# Patient Record
Sex: FEMALE | Race: WHITE | Employment: FULL TIME | URBAN - METROPOLITAN AREA
[De-identification: names, ages, dates, MRNs, and addresses within clinical notes are randomized per-mention and may not be internally consistent; named-entity substitution may affect disease eponyms.]

---

## 2017-03-20 ENCOUNTER — TELEPHONE (OUTPATIENT)
Dept: INTERNAL MEDICINE CLINIC | Age: 47
End: 2017-03-20

## 2017-03-20 NOTE — TELEPHONE ENCOUNTER
Patient states she needs a call back to get a Follow Up appt from her [facility] visit on 3/11/17 for Ear Pain & Swelling/Ear infection. Patient states antibiotics end this Wed., 3/22/17. Please call to schedule. No Appts available. Thank You

## 2017-03-20 NOTE — TELEPHONE ENCOUNTER
Spoke with patient she was placed on amoxicillin on 3/12 she will complete on Wed. Patient accepted an appointment with Dr. Lynnette Hoyos on Wed 3/22/17 at 3:30pm. Pt reported ear still feels  funny, like something was in it.

## 2017-03-22 ENCOUNTER — OFFICE VISIT (OUTPATIENT)
Dept: INTERNAL MEDICINE CLINIC | Age: 47
End: 2017-03-22

## 2017-03-22 VITALS
OXYGEN SATURATION: 97 % | BODY MASS INDEX: 37.49 KG/M2 | WEIGHT: 219.6 LBS | RESPIRATION RATE: 18 BRPM | TEMPERATURE: 97.9 F | HEIGHT: 64 IN | HEART RATE: 76 BPM | SYSTOLIC BLOOD PRESSURE: 125 MMHG | DIASTOLIC BLOOD PRESSURE: 86 MMHG

## 2017-03-22 DIAGNOSIS — H93.8X1 EAR PRESSURE, RIGHT: Primary | ICD-10-CM

## 2017-03-22 DIAGNOSIS — Z86.69 HISTORY OF EAR INFECTION: ICD-10-CM

## 2017-03-22 NOTE — PROGRESS NOTES
SUBJECTIVE:   Ms. Vinicius Messina is a 40 y.o. female who is here c/o ear right pain. Pt reports she was dx with a right ear infection and prescribed Amoxicillin at Urgent Care about a week and a half ago. Pt recalls being told her TM was pretty inflamed at the time. Pt claims she continues to have pain. Pt states she feels like something is in her ear. Pt endorses finishing Amoxicillin this morning. Pt denies having a fever. Pt c/o stiff neck and HA. At this time, she is otherwise doing well and has brought no other complaints to my attention today. For a list of the medical issues addressed today, see the assessment and plan below. PMH:   Past Medical History:   Diagnosis Date    Hypercholesterolemia     Hypertension     JULIAN on CPAP     Rosacea      PSH:  has a past surgical history that includes cholecystectomy ();  section (); and hysterectomy (). All: is allergic to codeine. MEDS:   Current Outpatient Prescriptions   Medication Sig    levothyroxine (SYNTHROID) 150 mcg tablet TAKE 1 TABLET BY MOUTH DAILY BEFORE BREAKFAST.  atorvastatin (LIPITOR) 10 mg tablet Take 1 Tab by mouth daily.  losartan-hydrochlorothiazide (HYZAAR) 100-25 mg per tablet Take 1 Tab by mouth daily.  multivitamin (ONE A DAY) tablet Take 1 Tab by mouth daily. No current facility-administered medications for this visit. FH: family history includes Cancer in her maternal grandfather and maternal grandmother; Diabetes in her mother; Heart Disease in her paternal grandfather; High Cholesterol in her father; Hypertension in her father and mother; Stroke in her mother. SH:  reports that she has never smoked. She has never used smokeless tobacco. She reports that she drinks alcohol.      Review of Systems - History obtained from the patient  General ROS: +HA, +stiff neck, otherwise negative  Psychological ROS: negative  Ophthalmic ROS: negative  ENT ROS: +feeling of something in right ear, otherwise negative  Respiratory ROS: no cough, shortness of breath, or wheezing  Cardiovascular ROS: no chest pain or dyspnea on exertion  Gastrointestinal ROS: no abdominal pain, change in bowel habits, or black or bloody stools  Genito-Urinary ROS: negative  Musculoskeletal ROS: negative  Neurological ROS: negative  Dermatological ROS: negative    OBJECTIVE:   Vitals:   Visit Vitals    /86 (BP 1 Location: Left arm, BP Patient Position: Sitting)    Pulse 76    Temp 97.9 °F (36.6 °C) (Oral)    Resp 18    Ht 5' 4\" (1.626 m)    Wt 219 lb 9.6 oz (99.6 kg)    SpO2 97%    BMI 37.69 kg/m2      Gen: Pleasant 40 y.o.  female in NAD. HEENT: NC/AT. EARS: TM's normal and canals equal bilaterally. No erythema or bulging. Right canal opening tender to palpation, no redness or pus. NECK: Supple. No LAD. No thyromegaly. HEART: RRR, No M/G/R. LUNGS: CTAB No W/R. EXTREMITIES: Warm. No C/C/E. NEURO: Alert and oriented x 3. Cranial nerves grossly intact. No focal sensory or motor deficits noted. SKIN: Warm. Dry. No rashes or other lesions noted. ASSESSMENT/ PLAN:   Richard Hartman was seen today for ear pain, other and other. Diagnoses and all orders for this visit:    Ear pressure, right    History of ear infection      Pt was advised to use polysporin on the opening of the right ear canal for 2-3 days. Pt will f/u if symptoms worsen or fail to improve. Follow-up Disposition:  Return if symptoms worsen or fail to improve. I have reviewed the patient's medications and risks/side effects/benefits were discussed. Diagnosis(-es) explained to patient and questions answered. Literature provided where appropriate.      Written by Elham Stewart, as dictated by Malena Thomas MD.

## 2017-03-22 NOTE — MR AVS SNAPSHOT
Visit Information Date & Time Provider Department Dept. Phone Encounter #  
 3/22/2017  3:30 PM Otilia Valente, [address] [id number] Follow-up Instructions Return if symptoms worsen or fail to improve. Your Appointments 5/5/2017  9:00 AM  
ROUTINE CARE with Otilia Valente MD  
[facility] [address]) Appt Note: 6 month follow up  
 [facility] [address] [id number]  
[address] [facility] [address] [place] [address] Upcoming Health Maintenance Date Due DTaP/Tdap/Td series (1 - Tdap) 9/2/1992 PAP AKA CERVICAL CYTOLOGY 9/2/1992 Allergies as of 3/22/2017  Review Complete On: 3/22/2017 By: Mortimer Gillis Severity Noted Reaction Type Reactions Codeine High 05/27/2014   Side Effect Nausea and Vomiting  
 severe N&V Current Immunizations  Reviewed on 11/18/2016 Name Date Influenza Vaccine (Quad) PF 11/18/2016  9:31 AM  
 Influenza Vaccine PF 11/20/2015 Not reviewed this visit You Were Diagnosed With   
  
 Codes Comments Ear pressure, right    -  Primary ICD-10-CM: H93.8X1 ICD-9-CM: 388.8 History of ear infection     ICD-10-CM: Z86.69 
ICD-9-CM: V12.49 Vitals BP Pulse Temp Resp Height(growth percentile) Weight(growth percentile) 125/86 (BP 1 Location: Left arm, BP Patient Position: Sitting) 76 97.9 °F (36.6 °C) (Oral) 18 5' 4\" (1.626 m) 219 lb 9.6 oz (99.6 kg) SpO2 BMI OB Status Smoking Status 97% 37.69 kg/m2 Hysterectomy Never Smoker BMI and BSA Data Body Mass Index Body Surface Area  
 37.69 kg/m 2 2.12 m 2 Preferred Pharmacy Pharmacy Name Phone [id number] [address], [address] [phone] Your Updated Medication List  
  
   
This list is accurate as of: 3/22/17  4:43 PM.  Always use your most recent med list.  
  
  
  
  
 atorvastatin 10 mg tablet Commonly known as:  LIPITOR Take 1 Tab by mouth daily. levothyroxine 150 mcg tablet Commonly known as:  SYNTHROID  
TAKE 1 TABLET BY MOUTH DAILY BEFORE BREAKFAST. losartan-hydroCHLOROthiazide 100-25 mg per tablet Commonly known as:  HYZAAR Take 1 Tab by mouth daily. multivitamin tablet Commonly known as:  ONE A DAY Take 1 Tab by mouth daily. Follow-up Instructions Return if symptoms worsen or fail to improve. Introducing [facility] & HEALTH SERVICES! Dear Cheli: 
Thank you for requesting a BuildZoom account. Our records indicate that you already have an active BuildZoom account. You can access your account anytime at [email or web address] Did you know that you can access your hospital and ER discharge instructions at any time in BuildZoom? You can also review all of your test results from your hospital stay or ER visit. Additional Information If you have questions, please visit the Frequently Asked Questions section of the BuildZoom website at [email or web address] Remember, BuildZoom is NOT to be used for urgent needs. For medical emergencies, dial 911. Now available from your iPhone and Android! Please provide this summary of care documentation to your next provider. Your primary care clinician is listed as Eliecer Guerin. If you have any questions after today's visit, please call [phone].

## 2017-03-22 NOTE — PROGRESS NOTES
Reviewed record in preparation for visit and have obtained necessary documentation. Identified pt with two pt identifiers(name and ). Chief Complaint   Patient presents with    Ear Pain     pt states she went to urgent care for right ear pain diagnose with an ear infectiom was giving an antibiotic but stilll havin alittle pain and discomfortable    Other     pt c/o of a stff neck withdiscorfable       Health Maintenance Due   Topic Date Due    DTaP/Tdap/Td series (1 - Tdap) 1992    PAP AKA CERVICAL CYTOLOGY  1992       Coordination of Care Questionnaire:  :     1. Have you been to the ER, urgent care clinic since your last visit? Hospitalized since your last visit?no  2. Have you seen or consulted any other health care providers outside of the [address] [place] [address] since your last visit? Include any pap smears or colon screening.  no

## 2017-04-07 RX ORDER — ATORVASTATIN CALCIUM 10 MG/1
TABLET, FILM COATED ORAL
Qty: 30 TAB | Refills: 5 | Status: SHIPPED | OUTPATIENT
Start: 2017-04-07 | End: 2017-10-17 | Stop reason: SDUPTHER

## 2017-07-06 DIAGNOSIS — E03.9 HYPOTHYROIDISM, UNSPECIFIED TYPE: ICD-10-CM

## 2017-07-06 NOTE — TELEPHONE ENCOUNTER
From: Josefina Chavez  To:  Ryanne Pugh MD  Sent: 7/6/2017 1:11 PM EDT  Subject: Medication Renewal Request    Original authorizing provider: MD Josefina Vazquez would like a refill of the following medications:  levothyroxine (SYNTHROID) 150 mcg tablet Ryanne Pugh MD]    Preferred pharmacy: [facility] [address] [place] [address]:

## 2017-07-07 RX ORDER — LEVOTHYROXINE SODIUM 150 UG/1
150 TABLET ORAL
Qty: 90 TAB | Refills: 1 | Status: SHIPPED | OUTPATIENT
Start: 2017-07-07

## 2017-09-15 DIAGNOSIS — I10 ESSENTIAL HYPERTENSION WITH GOAL BLOOD PRESSURE LESS THAN 140/90: ICD-10-CM

## 2017-09-18 RX ORDER — LOSARTAN POTASSIUM AND HYDROCHLOROTHIAZIDE 25; 100 MG/1; MG/1
TABLET ORAL
Qty: 30 TAB | Refills: 5 | Status: SHIPPED | OUTPATIENT
Start: 2017-09-18 | End: 2018-04-10

## 2017-10-17 RX ORDER — ATORVASTATIN CALCIUM 10 MG/1
TABLET, FILM COATED ORAL
Qty: 30 TAB | Refills: 5 | Status: SHIPPED | OUTPATIENT
Start: 2017-10-17

## 2018-03-16 ENCOUNTER — OFFICE VISIT (OUTPATIENT)
Dept: URGENT CARE | Age: 48
End: 2018-03-16

## 2018-03-16 VITALS
BODY MASS INDEX: 38.07 KG/M2 | WEIGHT: 223 LBS | HEART RATE: 60 BPM | TEMPERATURE: 98 F | HEIGHT: 64 IN | RESPIRATION RATE: 16 BRPM | DIASTOLIC BLOOD PRESSURE: 71 MMHG | SYSTOLIC BLOOD PRESSURE: 148 MMHG | OXYGEN SATURATION: 99 %

## 2018-03-16 DIAGNOSIS — H70.002 ACUTE MASTOIDITIS OF LEFT SIDE: Primary | ICD-10-CM

## 2018-03-16 LAB
BILIRUB UR QL STRIP: NORMAL
GLUCOSE UR-MCNC: NORMAL MG/DL
KETONES P FAST UR STRIP-MCNC: NORMAL MG/DL
PH UR STRIP: NORMAL [PH] (ref 4.6–8)
PROT UR QL STRIP: NORMAL
SP GR UR STRIP: NORMAL (ref 1–1.03)
UA UROBILINOGEN AMB POC: NORMAL (ref 0.2–1)
URINALYSIS CLARITY POC: NORMAL
URINALYSIS COLOR POC: NORMAL
URINE BLOOD POC: NORMAL
URINE LEUKOCYTES POC: NORMAL
URINE NITRITES POC: NORMAL

## 2018-03-16 RX ORDER — CEFDINIR 300 MG/1
300 CAPSULE ORAL 2 TIMES DAILY
Qty: 20 CAP | Refills: 0 | Status: SHIPPED | OUTPATIENT
Start: 2018-03-16 | End: 2018-03-26

## 2018-03-16 RX ORDER — PREDNISONE 20 MG/1
60 TABLET ORAL 2 TIMES DAILY
Qty: 15 TAB | Refills: 0 | Status: SHIPPED | OUTPATIENT
Start: 2018-03-16 | End: 2018-03-21

## 2018-03-16 NOTE — MR AVS SNAPSHOT
[facility] [address] [place] [id number] 
[phone] Patient: Rj Ramos MRN: [id number] TPK:[birth year] Visit Information Date & Time Provider Department Dept. Phone Encounter #  
 3/16/2018  5:30 PM [facility] [phone] [id number] Follow-up Instructions Return for Follow up with PCP. Upcoming Health Maintenance Date Due DTaP/Tdap/Td series (1 - Tdap) 9/2/1992 PAP AKA CERVICAL CYTOLOGY 9/2/1992 Influenza Age 5 to Adult 8/1/2017 Allergies as of 3/16/2018  Review Complete On: 3/16/2018 By: Teresa Lopez RN Severity Noted Reaction Type Reactions Codeine High 05/27/2014   Side Effect Nausea and Vomiting  
 severe N&V Current Immunizations  Reviewed on 11/18/2016 Name Date Influenza Vaccine (Quad) PF 11/18/2016  9:31 AM  
 Influenza Vaccine PF 11/20/2015 Not reviewed this visit You Were Diagnosed With   
  
 Codes Comments Acute mastoiditis of left side    -  Primary ICD-10-CM: H70.002 ICD-9-CM: 383.00 Vitals BP Pulse Temp Resp Height(growth percentile) Weight(growth percentile) 148/71 60 98 °F (36.7 °C) 16 5' 4\" (1.626 m) 223 lb (101.2 kg) SpO2 BMI OB Status Smoking Status 99% 38.28 kg/m2 Hysterectomy Never Smoker BMI and BSA Data Body Mass Index Body Surface Area  
 38.28 kg/m 2 2.14 m 2 Preferred Pharmacy Pharmacy Name Phone [id number], [address] [phone] Your Updated Medication List  
  
   
This list is accurate as of 3/16/18  6:51 PM.  Always use your most recent med list.  
  
  
  
  
 atorvastatin 10 mg tablet Commonly known as:  LIPITOR  
take 1 tablet by mouth once daily  
  
 cefdinir 300 mg capsule Commonly known as:  OMNICEF Take 1 Cap by mouth two (2) times a day for 10 days. levothyroxine 150 mcg tablet Commonly known as:  SYNTHROID Take 1 Tab by mouth Daily (before breakfast). losartan-hydroCHLOROthiazide 100-25 mg per tablet Commonly known as:  HYZAAR  
take 1 tablet by mouth once daily  
  
 multivitamin tablet Commonly known as:  ONE A DAY Take 1 Tab by mouth daily. predniSONE 20 mg tablet Commonly known as:  Ama Maxon Take 3 Tabs by mouth two (2) times a day for 5 days. With food Prescriptions Sent to Pharmacy Refills  
 cefdinir (OMNICEF) 300 mg capsule 0 Sig: Take 1 Cap by mouth two (2) times a day for 10 days. Class: Normal  
 Pharmacy: [address] Ph #: [phone] Route: Oral  
 predniSONE (DELTASONE) 20 mg tablet 0 Sig: Take 3 Tabs by mouth two (2) times a day for 5 days. With food Class: Normal  
 Pharmacy: [address] Ph #: [phone] Route: Oral  
  
We Performed the Following AMB POC URINALYSIS DIP STICK AUTO W/O MICRO [18676 CPT(R)] Follow-up Instructions Return for Follow up with PCP. Introducing [facility] & HEALTH SERVICES! Dear Cheli: 
Thank you for requesting a Invieo account. Our records indicate that you already have an active Invieo account. You can access your account anytime at [email or web address] Did you know that you can access your hospital and ER discharge instructions at any time in Invieo? You can also review all of your test results from your hospital stay or ER visit. Additional Information If you have questions, please visit the Frequently Asked Questions section of the Invieo website at [email or web address] Remember, Invieo is NOT to be used for urgent needs. For medical emergencies, dial 911. Now available from your iPhone and Android! Please provide this summary of care documentation to your next provider. Your primary care clinician is listed as NOT ON FILE. If you have any questions after today's visit, please call [phone].

## 2018-03-16 NOTE — PROGRESS NOTES
Patient is a 56 y.o. female presenting with sinus pain. The history is provided by the patient. Sinus Pain   This is a new problem. The current episode started more than 2 days ago. The problem occurs constantly. The problem has been rapidly worsening. Associated symptoms include headaches (on left tample area- behind ear- throbbing- 8/10- intermittent ). Associated symptoms comments: H/o cold and sinus congestion x 7-10 days. The symptoms are aggravated by bending. Nothing relieves the symptoms. She has tried nothing for the symptoms. Past Medical History:   Diagnosis Date    Hypercholesterolemia     Hypertension     JULIAN on CPAP     Rosacea         Past Surgical History:   Procedure Laterality Date    HX  SECTION  2008    HX CHOLECYSTECTOMY  2006    HX HYSTERECTOMY  2012         Family History   Problem Relation Age of Onset    Cancer Maternal Grandmother      colon    Cancer Maternal Grandfather      lung    Heart Disease Paternal Grandfather     Diabetes Mother     Hypertension Mother     Stroke Mother     Hypertension Father     High Cholesterol Father         Social History     Social History    Marital status:      Spouse name: N/A    Number of children: N/A    Years of education: N/A     Occupational History    Not on file. Social History Main Topics    Smoking status: Never Smoker    Smokeless tobacco: Never Used    Alcohol use 0.0 oz/week     0 Standard drinks or equivalent per week      Comment: occasionally    Drug use: Not on file    Sexual activity: Yes     Partners: Male     Birth control/ protection: Surgical     Other Topics Concern    Not on file     Social History Narrative                ALLERGIES: Codeine    Review of Systems   HENT: Positive for sinus pain. Neurological: Positive for headaches (on left tample area- behind ear- throbbing- 8/10- intermittent ). All other systems reviewed and are negative.       Vitals:    18 1744   BP: 148/71   Pulse: 60   Resp: 16   Temp: 98 °F (36.7 °C)   SpO2: 99%   Weight: 223 lb (101.2 kg)   Height: 5' 4\" (1.626 m)       Physical Exam   Constitutional: No distress. HENT:   Head:       Right Ear: Tympanic membrane and ear canal normal.   Left Ear: Tympanic membrane and ear canal normal.   Nose: Nose normal.   Mouth/Throat: No oropharyngeal exudate, posterior oropharyngeal edema or posterior oropharyngeal erythema. Eyes: Conjunctivae are normal. Right eye exhibits no discharge. Left eye exhibits no discharge. Neck: Neck supple. Pulmonary/Chest: Effort normal and breath sounds normal. No respiratory distress. She has no wheezes. She has no rales. Lymphadenopathy:     She has no cervical adenopathy. Skin: No rash noted. Nursing note and vitals reviewed. MDM    Procedures    Fluids/ gargles  Claritin/ allegra   Tylenol cold-sinus - max strength 1-2 tab 4 times/ day    with Advil as needed      ICD-10-CM ICD-9-CM    1. Acute mastoiditis of left side H70.002 383.00 AMB POC URINALYSIS DIP STICK AUTO W/O MICRO      cefdinir (OMNICEF) 300 mg capsule     Medications Ordered Today   Medications    cefdinir (OMNICEF) 300 mg capsule     Sig: Take 1 Cap by mouth two (2) times a day for 10 days. Dispense:  20 Cap     Refill:  0    predniSONE (DELTASONE) 20 mg tablet     Sig: Take 3 Tabs by mouth two (2) times a day for 5 days.  With food     Dispense:  15 Tab     Refill:  0     Results for orders placed or performed in visit on 03/16/18   AMB POC URINALYSIS DIP STICK AUTO W/O MICRO   Result Value Ref Range    Color (UA POC)      Clarity (UA POC)      Glucose (UA POC)  Negative    Bilirubin (UA POC)  Negative    Ketones (UA POC)  Negative    Specific gravity (UA POC)  1.001 - 1.035    Blood (UA POC)  Negative    pH (UA POC)  4.6 - 8.0    Protein (UA POC)  Negative    Urobilinogen (UA POC)  0.2 - 1    Nitrites (UA POC)  Negative    Leukocyte esterase (UA POC)  Negative    Narrative    This test was ordered in error. The patients condition was discussed with the patient and they understand. The patient is to follow up with primary care doctor. If signs and symptoms become worse the pt is to go to the ER. The patient is to take medications as prescribed.

## 2018-04-10 ENCOUNTER — APPOINTMENT (OUTPATIENT)
Dept: GENERAL RADIOLOGY | Age: 48
End: 2018-04-10
Attending: PHYSICIAN ASSISTANT
Payer: COMMERCIAL

## 2018-04-10 ENCOUNTER — HOSPITAL ENCOUNTER (EMERGENCY)
Age: 48
Discharge: HOME | End: 2018-04-10
Attending: EMERGENCY MEDICINE
Payer: COMMERCIAL

## 2018-04-10 VITALS
HEIGHT: 64 IN | DIASTOLIC BLOOD PRESSURE: 84 MMHG | OXYGEN SATURATION: 98 % | BODY MASS INDEX: 38.2 KG/M2 | TEMPERATURE: 97.4 F | HEART RATE: 97 BPM | SYSTOLIC BLOOD PRESSURE: 141 MMHG | WEIGHT: 223.77 LBS | RESPIRATION RATE: 16 BRPM

## 2018-04-10 DIAGNOSIS — R07.89 ATYPICAL CHEST PAIN: Primary | ICD-10-CM

## 2018-04-10 LAB
ALBUMIN SERPL-MCNC: 4.2 G/DL (ref 3.5–5)
ALBUMIN/GLOB SERPL: 1.3 {RATIO} (ref 1.1–2.2)
ALP SERPL-CCNC: 85 U/L (ref 45–117)
ALT SERPL-CCNC: 42 U/L (ref 12–78)
ANION GAP SERPL CALC-SCNC: 5 MMOL/L (ref 5–15)
APPEARANCE UR: CLEAR
AST SERPL-CCNC: 26 U/L (ref 15–37)
BASOPHILS # BLD: 0 K/UL (ref 0–0.1)
BASOPHILS NFR BLD: 0 % (ref 0–1)
BILIRUB SERPL-MCNC: 0.7 MG/DL (ref 0.2–1)
BILIRUB UR QL: NEGATIVE
BUN SERPL-MCNC: 17 MG/DL (ref 6–20)
BUN/CREAT SERPL: 15 (ref 12–20)
CALCIUM SERPL-MCNC: 9.3 MG/DL (ref 8.5–10.1)
CHLORIDE SERPL-SCNC: 101 MMOL/L (ref 97–108)
CK MB CFR SERPL CALC: 1.5 % (ref 0–2.5)
CK MB SERPL-MCNC: 1.6 NG/ML (ref 5–25)
CK SERPL-CCNC: 109 U/L (ref 26–192)
CO2 SERPL-SCNC: 32 MMOL/L (ref 21–32)
COLOR UR: NORMAL
CREAT SERPL-MCNC: 1.12 MG/DL (ref 0.55–1.02)
D DIMER PPP FEU-MCNC: 0.31 MG/L FEU (ref 0–0.65)
DIFFERENTIAL METHOD BLD: ABNORMAL
EOSINOPHIL # BLD: 0.1 K/UL (ref 0–0.4)
EOSINOPHIL NFR BLD: 1 % (ref 0–7)
ERYTHROCYTE [DISTWIDTH] IN BLOOD BY AUTOMATED COUNT: 12.5 % (ref 11.5–14.5)
GLOBULIN SER CALC-MCNC: 3.3 G/DL (ref 2–4)
GLUCOSE SERPL-MCNC: 95 MG/DL (ref 65–100)
GLUCOSE UR STRIP.AUTO-MCNC: NEGATIVE MG/DL
HCT VFR BLD AUTO: 39 % (ref 35–47)
HGB BLD-MCNC: 13.6 G/DL (ref 11.5–16)
HGB UR QL STRIP: NEGATIVE
IMM GRANULOCYTES # BLD: 0.1 K/UL (ref 0–0.04)
IMM GRANULOCYTES NFR BLD AUTO: 0 % (ref 0–0.5)
KETONES UR QL STRIP.AUTO: NEGATIVE MG/DL
LEUKOCYTE ESTERASE UR QL STRIP.AUTO: NEGATIVE
LYMPHOCYTES # BLD: 1.6 K/UL (ref 0.8–3.5)
LYMPHOCYTES NFR BLD: 14 % (ref 12–49)
MCH RBC QN AUTO: 29.4 PG (ref 26–34)
MCHC RBC AUTO-ENTMCNC: 34.9 G/DL (ref 30–36.5)
MCV RBC AUTO: 84.4 FL (ref 80–99)
MONOCYTES # BLD: 1.6 K/UL (ref 0–1)
MONOCYTES NFR BLD: 13 % (ref 5–13)
NEUTS SEG # BLD: 8.5 K/UL (ref 1.8–8)
NEUTS SEG NFR BLD: 71 % (ref 32–75)
NITRITE UR QL STRIP.AUTO: NEGATIVE
NRBC # BLD: 0 K/UL (ref 0–0.01)
NRBC BLD-RTO: 0 PER 100 WBC
PH UR STRIP: 5 [PH] (ref 5–8)
PLATELET # BLD AUTO: 322 K/UL (ref 150–400)
PMV BLD AUTO: 9.9 FL (ref 8.9–12.9)
POTASSIUM SERPL-SCNC: 3.4 MMOL/L (ref 3.5–5.1)
PROT SERPL-MCNC: 7.5 G/DL (ref 6.4–8.2)
PROT UR STRIP-MCNC: NEGATIVE MG/DL
RBC # BLD AUTO: 4.62 M/UL (ref 3.8–5.2)
SODIUM SERPL-SCNC: 138 MMOL/L (ref 136–145)
SP GR UR REFRACTOMETRY: 1.01 (ref 1–1.03)
TROPONIN I SERPL-MCNC: <0.04 NG/ML
UROBILINOGEN UR QL STRIP.AUTO: 0.2 EU/DL (ref 0.2–1)
WBC # BLD AUTO: 11.9 K/UL (ref 3.6–11)

## 2018-04-10 PROCEDURE — 84484 ASSAY OF TROPONIN QUANT: CPT | Performed by: PHYSICIAN ASSISTANT

## 2018-04-10 PROCEDURE — 36415 COLL VENOUS BLD VENIPUNCTURE: CPT | Performed by: PHYSICIAN ASSISTANT

## 2018-04-10 PROCEDURE — 71046 X-RAY EXAM CHEST 2 VIEWS: CPT

## 2018-04-10 PROCEDURE — 85025 COMPLETE CBC W/AUTO DIFF WBC: CPT | Performed by: PHYSICIAN ASSISTANT

## 2018-04-10 PROCEDURE — 80053 COMPREHEN METABOLIC PANEL: CPT | Performed by: PHYSICIAN ASSISTANT

## 2018-04-10 PROCEDURE — 82550 ASSAY OF CK (CPK): CPT | Performed by: PHYSICIAN ASSISTANT

## 2018-04-10 PROCEDURE — 85379 FIBRIN DEGRADATION QUANT: CPT | Performed by: PHYSICIAN ASSISTANT

## 2018-04-10 PROCEDURE — 81003 URINALYSIS AUTO W/O SCOPE: CPT | Performed by: EMERGENCY MEDICINE

## 2018-04-10 PROCEDURE — 93005 ELECTROCARDIOGRAM TRACING: CPT

## 2018-04-10 PROCEDURE — 99283 EMERGENCY DEPT VISIT LOW MDM: CPT

## 2018-04-10 RX ORDER — LISINOPRIL 20 MG/1
20 TABLET ORAL DAILY
Qty: 14 TAB | Refills: 0 | Status: SHIPPED | OUTPATIENT
Start: 2018-04-10

## 2018-04-10 RX ORDER — FAMOTIDINE 20 MG/1
20 TABLET, FILM COATED ORAL 2 TIMES DAILY
Qty: 20 TAB | Refills: 0 | Status: SHIPPED | OUTPATIENT
Start: 2018-04-10 | End: 2018-11-06

## 2018-04-10 NOTE — ED PROVIDER NOTES
EMERGENCY DEPARTMENT HISTORY AND PHYSICAL EXAM      Date: 4/10/2018  Patient Name: Sarah Escudero     I have seen and evaluated this patient in the Express Care portion of triage for generalized weakness, fatigue and nausea. The patients care will begin now and orders have been placed. This patient will be seen and provided further care in the Emergency Room. Written by Ravinder Atwood. Thomas, a scribe for [employer].    History of Presenting Illness     No chief complaint on file. History Provided By: Patient    HPI: Sarah Escudero, 56 y.o. female with PMHx significant for HTN, JULIAN and hypercholesterolemia, presents ambulatory to the ED with cc of intermittent chest pain/pressure radiating into neck and bilateral UE pressure since this morning. Pt also c/o nausea, fatigue and mild SOB today since awakening but states the fatigue has been progressively worsening for the past few days. Pt reports CP is currently 1/10 but intermittently experiences episodes of sharp CP, increasing pain to 3/10. She felt  normal yesterday and was able to go for a walk without any SOB (exercises on a treadmill at home). She has not taken any medications for her sxs. There are no modifying factors. Pt reports being placed on Prednisone and an abx in March for mastoiditis but those symptoms have resolved. Pt does not have a significant cardiac hx. She denies any significant recent travel. She takes Synthroid, losartan and Lipitor daily. She denies tobacco use and endorses minimal EtOH consumption. She denies any fevers, chills, vomiting, diarrhea, or leg swelling. PCP: Not On File [facility]    There are no other complaints, changes, or physical findings at this time.     Current Outpatient Prescriptions   Medication Sig Dispense Refill    atorvastatin (LIPITOR) 10 mg tablet take 1 tablet by mouth once daily 30 Tab 5    losartan-hydroCHLOROthiazide (HYZAAR) 100-25 mg per tablet take 1 tablet by mouth once daily 30 Tab 5  levothyroxine (SYNTHROID) 150 mcg tablet Take 1 Tab by mouth Daily (before breakfast). 90 Tab 1    multivitamin (ONE A DAY) tablet Take 1 Tab by mouth daily. Past History     Past Medical History:  Past Medical History:   Diagnosis Date    Hypercholesterolemia     Hypertension     JULIAN on CPAP     Rosacea        Past Surgical History:  Past Surgical History:   Procedure Laterality Date    HX  SECTION  2008    HX CHOLECYSTECTOMY  2006    HX HYSTERECTOMY  2012       Family History:  Family History   Problem Relation Age of Onset    Cancer Maternal Grandmother      colon    Cancer Maternal Grandfather      lung    Heart Disease Paternal Grandfather     Diabetes Mother     Hypertension Mother     Stroke Mother     Hypertension Father     High Cholesterol Father        Social History:  Social History   Substance Use Topics    Smoking status: Never Smoker    Smokeless tobacco: Never Used    Alcohol use 0.0 oz/week     0 Standard drinks or equivalent per week      Comment: occasionally       Allergies: Allergies   Allergen Reactions    Codeine Nausea and Vomiting     severe N&V         Review of Systems   Review of Systems   Constitutional: Positive for fatigue. Negative for activity change, appetite change, chills, fever and unexpected weight change. HENT: Negative for congestion. Eyes: Negative for pain and visual disturbance. Respiratory: Positive for shortness of breath (mild). Negative for cough. Cardiovascular: Positive for chest pain. Gastrointestinal: Positive for nausea. Negative for abdominal pain, diarrhea and vomiting. Genitourinary: Negative for dysuria. Musculoskeletal: Negative for back pain. Skin: Negative for rash. Neurological: Negative for headaches. Physical Exam   Physical Exam   Constitutional: She is oriented to person, place, and time. She appears well-developed and well-nourished. HENT:   Head: Normocephalic and atraumatic. Mouth/Throat: Oropharynx is clear and moist.   Eyes: Conjunctivae and EOM are normal. Pupils are equal, round, and reactive to light. Right eye exhibits no discharge. Left eye exhibits no discharge. Neck: Normal range of motion. Neck supple. Cardiovascular: Normal rate, regular rhythm and normal heart sounds. No murmur heard. Pulmonary/Chest: Effort normal and breath sounds normal. No respiratory distress. She has no wheezes. She has no rales. Abdominal: Soft. Bowel sounds are normal. She exhibits no distension. There is no tenderness. Musculoskeletal: Normal range of motion. She exhibits no edema. Neurological: She is alert and oriented to person, place, and time. No cranial nerve deficit. She exhibits normal muscle tone. Skin: Skin is warm and dry. No rash noted. She is not diaphoretic. Nursing note and vitals reviewed. Diagnostic Study Results     Labs -     Recent Results (from the past 12 hour(s))   D DIMER    Collection Time: 04/10/18  4:14 PM   Result Value Ref Range    D-dimer 0.31 0.00 - 0.65 mg/L FEU   CBC WITH AUTOMATED DIFF    Collection Time: 04/10/18  4:14 PM   Result Value Ref Range    WBC 11.9 (H) 3.6 - 11.0 K/uL    RBC 4.62 3.80 - 5.20 M/uL    HGB 13.6 11.5 - 16.0 g/dL    HCT 39.0 35.0 - 47.0 %    MCV 84.4 80.0 - 99.0 FL    MCH 29.4 26.0 - 34.0 PG    MCHC 34.9 30.0 - 36.5 g/dL    RDW 12.5 11.5 - 14.5 %    PLATELET 158 146 - 841 K/uL    MPV 9.9 8.9 - 12.9 FL    NRBC 0.0 0  WBC    ABSOLUTE NRBC 0.00 0.00 - 0.01 K/uL    NEUTROPHILS 71 32 - 75 %    LYMPHOCYTES 14 12 - 49 %    MONOCYTES 13 5 - 13 %    EOSINOPHILS 1 0 - 7 %    BASOPHILS 0 0 - 1 %    IMMATURE GRANULOCYTES 0 0.0 - 0.5 %    ABS. NEUTROPHILS 8.5 (H) 1.8 - 8.0 K/UL    ABS. LYMPHOCYTES 1.6 0.8 - 3.5 K/UL    ABS. MONOCYTES 1.6 (H) 0.0 - 1.0 K/UL    ABS. EOSINOPHILS 0.1 0.0 - 0.4 K/UL    ABS. BASOPHILS 0.0 0.0 - 0.1 K/UL    ABS. IMM.  GRANS. 0.1 (H) 0.00 - 0.04 K/UL    DF AUTOMATED     METABOLIC PANEL, COMPREHENSIVE    Collection Time: 04/10/18  4:14 PM   Result Value Ref Range    Sodium 138 136 - 145 mmol/L    Potassium 3.4 (L) 3.5 - 5.1 mmol/L    Chloride 101 97 - 108 mmol/L    CO2 32 21 - 32 mmol/L    Anion gap 5 5 - 15 mmol/L    Glucose 95 65 - 100 mg/dL    BUN 17 6 - 20 MG/DL    Creatinine 1.12 (H) 0.55 - 1.02 MG/DL    BUN/Creatinine ratio 15 12 - 20      GFR est AA >60 >60 ml/min/1.73m2    GFR est non-AA 52 (L) >60 ml/min/1.73m2    Calcium 9.3 8.5 - 10.1 MG/DL    Bilirubin, total 0.7 0.2 - 1.0 MG/DL    ALT (SGPT) 42 12 - 78 U/L    AST (SGOT) 26 15 - 37 U/L    Alk.  phosphatase 85 45 - 117 U/L    Protein, total 7.5 6.4 - 8.2 g/dL    Albumin 4.2 3.5 - 5.0 g/dL    Globulin 3.3 2.0 - 4.0 g/dL    A-G Ratio 1.3 1.1 - 2.2     TROPONIN I    Collection Time: 04/10/18  4:14 PM   Result Value Ref Range    Troponin-I, Qt. <0.04 <0.05 ng/mL   CK W/ CKMB & INDEX    Collection Time: 04/10/18  4:14 PM   Result Value Ref Range     26 - 192 U/L    CK - MB 1.6 <3.6 NG/ML    CK-MB Index 1.5 0 - 2.5     EKG, 12 LEAD, INITIAL    Collection Time: 04/10/18  4:16 PM   Result Value Ref Range    Ventricular Rate 81 BPM    Atrial Rate 81 BPM    P-R Interval 160 ms    QRS Duration 92 ms    Q-T Interval 402 ms    QTC Calculation (Bezet) 466 ms    Calculated P Axis 44 degrees    Calculated R Axis 25 degrees    Calculated T Axis 26 degrees    Diagnosis       Normal sinus rhythm  Possible Left atrial enlargement  Borderline ECG  When compared with ECG of 17-JUL-2015 19:50,  No significant change was found     URINALYSIS W/ RFLX MICROSCOPIC    Collection Time: 04/10/18  5:49 PM   Result Value Ref Range    Color YELLOW/STRAW      Appearance CLEAR CLEAR      Specific gravity 1.010 1.003 - 1.030      pH (UA) 5.0 5.0 - 8.0      Protein NEGATIVE  NEG mg/dL    Glucose NEGATIVE  NEG mg/dL    Ketone NEGATIVE  NEG mg/dL    Bilirubin NEGATIVE  NEG      Blood NEGATIVE  NEG      Urobilinogen 0.2 0.2 - 1.0 EU/dL    Nitrites NEGATIVE  NEG Leukocyte Esterase NEGATIVE  NEG         Radiologic Studies -   XR CHEST PA LAT   Final Result        CT Results  (Last 48 hours)    None        CXR Results  (Last 48 hours)               04/10/18 1632  XR CHEST PA LAT Final result    Impression:  Impression: No acute process. Narrative:  Exam:  2 view chest       Indication: Chest pain radiating to both arms, shortness of breath           Comparison: 7/17/2015       PA and lateral views demonstrate normal heart size. There is no acute process in   the lung fields. The osseous structures are unremarkable. Medical Decision Making   I am the first provider for this patient. I reviewed the vital signs, available nursing notes, past medical history, past surgical history, family history and social history. Vital Signs-Reviewed the patient's vital signs. Patient Vitals for the past 12 hrs:   Temp Pulse Resp BP SpO2   04/10/18 1555 97.4 °F (36.3 °C) 97 16 141/84 98 %       Pulse Oximetry Analysis - 98% on RA    Cardiac Monitor:   Rate: 97 bpm  Rhythm: Normal Sinus Rhythm      EKG interpretation: (Preliminary) 1616  Rhythm: normal sinus rhythm; and regular . Rate (approx.): 81; Axis: normal; MT interval: normal; QRS interval: normal ; ST/T wave: normal; Other findings: normal.  Written by Jose Luis Boucher ED Scribe, as dictated by Viky Lozano MD.    Records Reviewed: Old Medical Records    Provider Notes (Medical Decision Making):   Middle aged female with hx of HTN and hyperlipidemia presents with CP >6 hours, low risk for PE or dissection. Possible UA with normal EKG and normal exercise tolerance. ED Course:   Initial assessment performed. The patients presenting problems have been discussed, and they are in agreement with the care plan formulated and outlined with them. I have encouraged them to ask questions as they arise throughout their visit. 5:20 PM  Discussed available results with pt.  Given significant increase in Creat with drop in GFR and normal BUN:creat discussed possible change in medications from combo with HCTZ to lisinopril. Advised increase hydration tomorrow with recheck PCP this week. Patient has appt Thursday. Return precautions discussed. Cardiology contact given for ETT. Critical Care Time: none     Disposition:  Discharge Note:  6:28 PM  The pt is ready for discharge. The pt's signs, symptoms, diagnosis, and discharge instructions have been discussed and pt has conveyed their understanding. The pt is to follow up as recommended or return to ER should their symptoms worsen. Plan has been discussed and pt is in agreement. PLAN:  1. Current Discharge Medication List      START taking these medications    Details   famotidine (PEPCID) 20 mg tablet Take 1 Tab by mouth two (2) times a day. Qty: 20 Tab, Refills: 0      lisinopril (PRINIVIL, ZESTRIL) 20 mg tablet Take 1 Tab by mouth daily. Qty: 14 Tab, Refills: 0         STOP taking these medications       losartan-hydroCHLOROthiazide (HYZAAR) 100-25 mg per tablet Comments:   Reason for Stoppin.   Follow-up Information     Follow up With Details Comments [address] [facility] Call [phone] to schedule a consultation appt for stress testing. [address]  [place] [address]      [facility] EMERGENCY DEPT  If symptoms worsen [address] [facility] [place] [facility] [place] [address]  [phone]        Return to ED if worse     Diagnosis     Clinical Impression:   1. Atypical chest pain        Attestations:    Attestations: This note is prepared by Alpa Spicer, acting as Scribe for MD Aditya Borges MD: The scribe's documentation has been prepared under my direction and personally reviewed by me in its entirety. I confirm that the note above accurately reflects all work, treatment, procedures, and medical decision making performed by me.

## 2018-04-10 NOTE — LETTER
[facility] EMERGENCY DEPT 
[address] [id number] [phone] Work/School Note Date: 4/10/2018 To Whom It May concern: 
 
Michelle Morse was seen and treated today in the emergency room by the following provider(s): 
Attending Provider: Butch Allen. MD [place].   
 
Michelle Morse may return to work on 4/13/18. Sincerely, 
 
 
 
 
Butch Allen.  MD [place]

## 2018-04-10 NOTE — DISCHARGE INSTRUCTIONS
Chest Pain: Care Instructions  Your Care Instructions  There are many things that can cause chest pain. Some are not serious and will get better on their own in a few days. But some kinds of chest pain need more testing and treatment. Your doctor may have recommended a follow-up visit in the next 8 to 12 hours. If you are not getting better, you may need more tests or treatment. Even though your doctor has released you, you still need to watch for any problems. The doctor carefully checked you, but sometimes problems can develop later. If you have new symptoms or if your symptoms do not get better, get medical care right away. If you have worse or different chest pain or pressure that lasts more than 5 minutes or you passed out (lost consciousness), call 911 or seek other emergency help right away. A medical visit is only one step in your treatment. Even if you feel better, you still need to do what your doctor recommends, such as going to all suggested follow-up appointments and taking medicines exactly as directed. This will help you recover and help prevent future problems. How can you care for yourself at home? · Rest until you feel better. · Take your medicine exactly as prescribed. Call your doctor if you think you are having a problem with your medicine. · Do not drive after taking a prescription pain medicine. When should you call for help? Call 911 if:  ? · You passed out (lost consciousness). ? · You have severe difficulty breathing. ? · You have symptoms of a heart attack. These may include:  ¨ Chest pain or pressure, or a strange feeling in your chest.  ¨ Sweating. ¨ Shortness of breath. ¨ Nausea or vomiting. ¨ Pain, pressure, or a strange feeling in your back, neck, jaw, or upper belly or in one or both shoulders or arms. ¨ Lightheadedness or sudden weakness. ¨ A fast or irregular heartbeat.   After you call 911, the  may tell you to chew 1 adult-strength or 2 to 4 low-dose aspirin. Wait for an ambulance. Do not try to drive yourself. ?Call your doctor today if:  ? · You have any trouble breathing. ? · Your chest pain gets worse. ? · You are dizzy or lightheaded, or you feel like you may faint. ? · You are not getting better as expected. ? · You are having new or different chest pain. Where can you learn more? Go to http://pita-mane.info/. Enter A120 in the search box to learn more about \"Chest Pain: Care Instructions. \"  Current as of: March 20, 2017  Content Version: 11.4  © 8082-5446 [employer]. Care instructions adapted under license by [employer] (which disclaims liability or warranty for this information). If you have questions about a medical condition or this instruction, always ask your healthcare professional. [address] any warranty or liability for your use of this information.

## 2018-04-10 NOTE — ED NOTES
Patient discharged by Dr. Nahed Zaldivar. Patient provided with discharge instructions Rx and instructions on follow up care. Patient out of ED ambulatory accompanied by self.

## 2018-04-10 NOTE — ED NOTES
Pt. Presents to ED today for complaints of chest pain and generally \"not feeling well\" for the past few days. Patient reports feeling nauseated after eating lunch today. Pt. Alert and oriented x4. Pt. Placed in position of comfort with call bell in reach.

## 2018-04-12 LAB
ATRIAL RATE: 81 BPM
CALCULATED P AXIS, ECG09: 44 DEGREES
CALCULATED R AXIS, ECG10: 25 DEGREES
CALCULATED T AXIS, ECG11: 26 DEGREES
DIAGNOSIS, 93000: NORMAL
P-R INTERVAL, ECG05: 160 MS
Q-T INTERVAL, ECG07: 402 MS
QRS DURATION, ECG06: 92 MS
QTC CALCULATION (BEZET), ECG08: 466 MS
VENTRICULAR RATE, ECG03: 81 BPM

## 2018-11-06 ENCOUNTER — HOSPITAL ENCOUNTER (EMERGENCY)
Age: 48
Discharge: HOME | End: 2018-11-06
Attending: EMERGENCY MEDICINE | Admitting: EMERGENCY MEDICINE
Payer: COMMERCIAL

## 2018-11-06 ENCOUNTER — APPOINTMENT (OUTPATIENT)
Dept: CT IMAGING | Age: 48
End: 2018-11-06
Attending: PHYSICIAN ASSISTANT
Payer: COMMERCIAL

## 2018-11-06 VITALS
SYSTOLIC BLOOD PRESSURE: 134 MMHG | RESPIRATION RATE: 16 BRPM | BODY MASS INDEX: 38.28 KG/M2 | DIASTOLIC BLOOD PRESSURE: 77 MMHG | HEART RATE: 66 BPM | TEMPERATURE: 98.3 F | HEIGHT: 64 IN | OXYGEN SATURATION: 100 % | WEIGHT: 224.21 LBS

## 2018-11-06 DIAGNOSIS — M47.812 OSTEOARTHRITIS OF CERVICAL SPINE, UNSPECIFIED SPINAL OSTEOARTHRITIS COMPLICATION STATUS: ICD-10-CM
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
## 2018-11-06 DIAGNOSIS — S13.4XXA WHIPLASH INJURY TO NECK, INITIAL ENCOUNTER: ICD-10-CM

## 2018-11-06 DIAGNOSIS — S09.90XA CLOSED HEAD INJURY, INITIAL ENCOUNTER: ICD-10-CM

## 2018-11-06 PROCEDURE — 99282 EMERGENCY DEPT VISIT SF MDM: CPT

## 2018-11-06 PROCEDURE — 70450 CT HEAD/BRAIN W/O DYE: CPT

## 2018-11-06 PROCEDURE — 72125 CT NECK SPINE W/O DYE: CPT

## 2018-11-06 RX ORDER — BUTALBITAL, ACETAMINOPHEN AND CAFFEINE 300; 40; 50 MG/1; MG/1; MG/1
1 CAPSULE ORAL
Qty: 6 CAP | Refills: 0 | Status: SHIPPED | OUTPATIENT
Start: 2018-11-06 | End: 2022-03-17 | Stop reason: CLARIF
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
## 2018-11-06 NOTE — LETTER
[address] 
[facility] EMERGENCY DEPT 
[address] [place] [address] [address] [address] [phone] Work/School Note Date: 11/6/2018 To Whom It May concern: 
 
Virginia Wang was seen and treated today in the emergency room by the following provider(s): 
Attending Provider: Cynthia De La Torre MD 
Physician Assistant: MARICHUY Berg. Virginia Wang may return to work on 11/8/2018. Sincerely, 
 
 
 
 
Dennie Christian.  Clemencia Balderas

## 2019-04-01 ENCOUNTER — HOSPITAL ENCOUNTER (OUTPATIENT)
Dept: VASCULAR SURGERY | Age: 49
Discharge: HOME | End: 2019-04-01
Attending: FAMILY MEDICINE
Payer: COMMERCIAL

## 2019-04-01 DIAGNOSIS — M79.661 PAIN OF RIGHT LOWER LEG: ICD-10-CM

## 2019-04-01 PROCEDURE — 93971 EXTREMITY STUDY: CPT

## 2019-06-30 ENCOUNTER — APPOINTMENT (OUTPATIENT)
Dept: GENERAL RADIOLOGY | Age: 49
End: 2019-06-30
Attending: EMERGENCY MEDICINE
Payer: COMMERCIAL

## 2019-06-30 ENCOUNTER — HOSPITAL ENCOUNTER (EMERGENCY)
Age: 49
Discharge: HOME | End: 2019-06-30
Attending: EMERGENCY MEDICINE
Payer: COMMERCIAL

## 2019-06-30 VITALS
DIASTOLIC BLOOD PRESSURE: 92 MMHG | SYSTOLIC BLOOD PRESSURE: 142 MMHG | TEMPERATURE: 98.3 F | RESPIRATION RATE: 16 BRPM | WEIGHT: 231.48 LBS | BODY MASS INDEX: 39.52 KG/M2 | HEART RATE: 69 BPM | HEIGHT: 64 IN | OXYGEN SATURATION: 100 %

## 2019-06-30 DIAGNOSIS — R07.9 CHEST PAIN, UNSPECIFIED TYPE: Primary | ICD-10-CM

## 2019-06-30 LAB
ALBUMIN SERPL-MCNC: 4.1 G/DL (ref 3.5–5)
ALBUMIN/GLOB SERPL: 1.2 {RATIO} (ref 1.1–2.2)
ALP SERPL-CCNC: 94 U/L (ref 45–117)
ALT SERPL-CCNC: 31 U/L (ref 12–78)
ANION GAP SERPL CALC-SCNC: 8 MMOL/L (ref 5–15)
AST SERPL-CCNC: 16 U/L (ref 15–37)
BASOPHILS # BLD: 0 K/UL (ref 0–0.1)
BASOPHILS NFR BLD: 0 % (ref 0–1)
BILIRUB SERPL-MCNC: 0.4 MG/DL (ref 0.2–1)
BUN SERPL-MCNC: 12 MG/DL (ref 6–20)
BUN/CREAT SERPL: 15 (ref 12–20)
CALCIUM SERPL-MCNC: 9.1 MG/DL (ref 8.5–10.1)
CHLORIDE SERPL-SCNC: 106 MMOL/L (ref 97–108)
CK SERPL-CCNC: 54 U/L (ref 26–192)
CO2 SERPL-SCNC: 27 MMOL/L (ref 21–32)
CREAT SERPL-MCNC: 0.78 MG/DL (ref 0.55–1.02)
DIFFERENTIAL METHOD BLD: ABNORMAL
EOSINOPHIL # BLD: 3.1 K/UL (ref 0–0.4)
EOSINOPHIL NFR BLD: 26 % (ref 0–7)
ERYTHROCYTE [DISTWIDTH] IN BLOOD BY AUTOMATED COUNT: 12.6 % (ref 11.5–14.5)
GLOBULIN SER CALC-MCNC: 3.3 G/DL (ref 2–4)
GLUCOSE SERPL-MCNC: 82 MG/DL (ref 65–100)
HCT VFR BLD AUTO: 41.8 % (ref 35–47)
HGB BLD-MCNC: 13.7 G/DL (ref 11.5–16)
IMM GRANULOCYTES # BLD AUTO: 0.1 K/UL (ref 0–0.04)
IMM GRANULOCYTES NFR BLD AUTO: 1 % (ref 0–0.5)
LYMPHOCYTES # BLD: 3.1 K/UL (ref 0.8–3.5)
LYMPHOCYTES NFR BLD: 26 % (ref 12–49)
MCH RBC QN AUTO: 28.8 PG (ref 26–34)
MCHC RBC AUTO-ENTMCNC: 32.8 G/DL (ref 30–36.5)
MCV RBC AUTO: 87.8 FL (ref 80–99)
MONOCYTES # BLD: 1.3 K/UL (ref 0–1)
MONOCYTES NFR BLD: 11 % (ref 5–13)
NEUTS SEG # BLD: 4.3 K/UL (ref 1.8–8)
NEUTS SEG NFR BLD: 36 % (ref 32–75)
NRBC # BLD: 0 K/UL (ref 0–0.01)
NRBC BLD-RTO: 0 PER 100 WBC
PLATELET # BLD AUTO: 295 K/UL (ref 150–400)
PMV BLD AUTO: 10.2 FL (ref 8.9–12.9)
POTASSIUM SERPL-SCNC: 4.2 MMOL/L (ref 3.5–5.1)
PROT SERPL-MCNC: 7.4 G/DL (ref 6.4–8.2)
RBC # BLD AUTO: 4.76 M/UL (ref 3.8–5.2)
RBC MORPH BLD: ABNORMAL
SODIUM SERPL-SCNC: 141 MMOL/L (ref 136–145)
TROPONIN I SERPL-MCNC: <0.05 NG/ML
WBC # BLD AUTO: 11.9 K/UL (ref 3.6–11)

## 2019-06-30 PROCEDURE — 80053 COMPREHEN METABOLIC PANEL: CPT

## 2019-06-30 PROCEDURE — 74011250636 HC RX REV CODE- 250/636: Performed by: EMERGENCY MEDICINE

## 2019-06-30 PROCEDURE — 71045 X-RAY EXAM CHEST 1 VIEW: CPT

## 2019-06-30 PROCEDURE — 99283 EMERGENCY DEPT VISIT LOW MDM: CPT

## 2019-06-30 PROCEDURE — 74011250637 HC RX REV CODE- 250/637: Performed by: EMERGENCY MEDICINE

## 2019-06-30 PROCEDURE — 82550 ASSAY OF CK (CPK): CPT

## 2019-06-30 PROCEDURE — 36415 COLL VENOUS BLD VENIPUNCTURE: CPT

## 2019-06-30 PROCEDURE — 85025 COMPLETE CBC W/AUTO DIFF WBC: CPT

## 2019-06-30 PROCEDURE — 93005 ELECTROCARDIOGRAM TRACING: CPT

## 2019-06-30 PROCEDURE — 84484 ASSAY OF TROPONIN QUANT: CPT

## 2019-06-30 PROCEDURE — 74011000250 HC RX REV CODE- 250: Performed by: EMERGENCY MEDICINE

## 2019-06-30 PROCEDURE — 96374 THER/PROPH/DIAG INJ IV PUSH: CPT

## 2019-06-30 RX ORDER — FAMOTIDINE 20 MG/1
20 TABLET, FILM COATED ORAL 2 TIMES DAILY
Qty: 20 TAB | Refills: 0 | Status: SHIPPED | OUTPATIENT
Start: 2019-06-30 | End: 2019-07-10

## 2019-06-30 RX ORDER — SUCRALFATE 1 G/10ML
1 SUSPENSION ORAL 4 TIMES DAILY
Qty: 500 ML | Refills: 0 | Status: SHIPPED | OUTPATIENT
Start: 2019-06-30 | End: 2022-03-17 | Stop reason: CLARIF

## 2019-06-30 RX ADMIN — LIDOCAINE HYDROCHLORIDE 40 ML: 20 SOLUTION ORAL; TOPICAL at 21:21

## 2019-06-30 RX ADMIN — FAMOTIDINE 20 MG: 10 INJECTION, SOLUTION INTRAVENOUS at 21:21

## 2019-07-01 LAB
ATRIAL RATE: 65 BPM
CALCULATED P AXIS, ECG09: 53 DEGREES
CALCULATED R AXIS, ECG10: 21 DEGREES
CALCULATED T AXIS, ECG11: 35 DEGREES
DIAGNOSIS, 93000: NORMAL
P-R INTERVAL, ECG05: 152 MS
Q-T INTERVAL, ECG07: 370 MS
QRS DURATION, ECG06: 88 MS
QTC CALCULATION (BEZET), ECG08: 384 MS
VENTRICULAR RATE, ECG03: 65 BPM

## 2019-07-01 NOTE — ED PROVIDER NOTES
EMERGENCY DEPARTMENT HISTORY AND PHYSICAL EXAM      Date: 6/30/2019  Patient Name: Ajay Frost    Please note that this dictation was completed with Elecar, the computer voice recognition software. Quite often unanticipated grammatical, syntax, homophones, and other interpretive errors are inadvertently transcribed by the computer software. Please disregard these errors. Please excuse any errors that have escaped final proofreading. History of Presenting Illness     Chief Complaint   Patient presents with    Chest Pain     reports mid-sternal chest pressure x2 days and SOB since this morning; denies worsening with exertion       History Provided By: Patient    HPI: Ajay Frost, 53 y.o. female, presenting emergency department complaining of chest pain for the past 2 days. Patient states that she is having pain that she describes as a pressure in her chest.  Was associated with some shortness of breath. No exacerbating or relieving factors. Pain is continued today with shortness of breath which prompted her visit. No other associated symptoms. Patient denies any nausea, vomiting, diaphoresis. No prior history of coronary artery disease. PCP: Saleem Gonzalez MD    No current facility-administered medications on file prior to encounter. Current Outpatient Medications on File Prior to Encounter   Medication Sig Dispense Refill    butalbital-acetaminophen-caff (FIORICET) -40 mg per capsule Take 1 Cap by mouth every six (6) hours as needed for Pain or Headache. 6 Cap 0    lisinopril (PRINIVIL, ZESTRIL) 20 mg tablet Take 1 Tab by mouth daily. 14 Tab 0    atorvastatin (LIPITOR) 10 mg tablet take 1 tablet by mouth once daily 30 Tab 5    levothyroxine (SYNTHROID) 150 mcg tablet Take 1 Tab by mouth Daily (before breakfast). 90 Tab 1    multivitamin (ONE A DAY) tablet Take 1 Tab by mouth daily.          Past History     Past Medical History:  Past Medical History:   Diagnosis Date    Hypercholesterolemia     Hypertension     JULIAN on CPAP     Rosacea        Past Surgical History:  Past Surgical History:   Procedure Laterality Date    HX  SECTION  2008    HX CHOLECYSTECTOMY  2006    HX HYSTERECTOMY  2012       Family History:  Family History   Problem Relation Age of Onset    Cancer Maternal Grandmother         colon    Cancer Maternal Grandfather         lung    Heart Disease Paternal Grandfather     Diabetes Mother     Hypertension Mother     Stroke Mother     Hypertension Father     High Cholesterol Father        Social History:  Social History     Tobacco Use    Smoking status: Never Smoker    Smokeless tobacco: Never Used   Substance Use Topics    Alcohol use: Yes     Alcohol/week: 0.0 oz     Comment: occasionally    Drug use: Not on file       Allergies: Allergies   Allergen Reactions    Codeine Nausea and Vomiting     severe N&V         Review of Systems   Review of Systems   Constitutional: Negative for chills and fever. HENT: Negative for congestion and sore throat. Eyes: Negative for visual disturbance. Respiratory: Positive for shortness of breath. Negative for cough. Cardiovascular: Positive for chest pain. Negative for leg swelling. Gastrointestinal: Negative for abdominal pain, blood in stool, diarrhea and nausea. Endocrine: Negative for polyuria. Genitourinary: Negative for dysuria, flank pain, vaginal bleeding and vaginal discharge. Musculoskeletal: Negative for myalgias. Skin: Negative for rash. Allergic/Immunologic: Negative for immunocompromised state. Neurological: Negative for weakness and headaches. Psychiatric/Behavioral: Negative for confusion. Physical Exam   Physical Exam   Constitutional: oriented to person, place, and time. appears well-developed and well-nourished. HENT:   Head: Normocephalic and atraumatic. Moist mucous membranes   Eyes: Pupils are equal, round, and reactive to light.  Conjunctivae are normal. Right eye exhibits no discharge. Left eye exhibits no discharge. Neck: Normal range of motion. Neck supple. No tracheal deviation present. Cardiovascular: Normal rate, regular rhythm and normal heart sounds. No murmur heard. Pulmonary/Chest: Effort normal and breath sounds normal. No respiratory distress. no wheezes. no rales. Abdominal: Soft. Bowel sounds are normal. There is no tenderness. There is no rebound and no guarding. Musculoskeletal: Normal range of motion. exhibits no edema, tenderness or deformity. Neurological: alert and oriented to person, place, and time. Skin: Skin is warm and dry. No rash noted. No erythema. Psychiatric: behavior is normal.   Nursing note and vitals reviewed. Diagnostic Study Results     Labs -     Recent Results (from the past 12 hour(s))   EKG, 12 LEAD, INITIAL    Collection Time: 06/30/19  7:55 PM   Result Value Ref Range    Ventricular Rate 65 BPM    Atrial Rate 65 BPM    P-R Interval 152 ms    QRS Duration 88 ms    Q-T Interval 370 ms    QTC Calculation (Bezet) 384 ms    Calculated P Axis 53 degrees    Calculated R Axis 21 degrees    Calculated T Axis 35 degrees    Diagnosis       Normal sinus rhythm  Possible Left atrial enlargement  Nonspecific ST and T wave abnormality  When compared with ECG of 10-APR-2018 16:16,  QT has shortened     CBC WITH AUTOMATED DIFF    Collection Time: 06/30/19  8:05 PM   Result Value Ref Range    WBC 11.9 (H) 3.6 - 11.0 K/uL    RBC 4.76 3.80 - 5.20 M/uL    HGB 13.7 11.5 - 16.0 g/dL    HCT 41.8 35.0 - 47.0 %    MCV 87.8 80.0 - 99.0 FL    MCH 28.8 26.0 - 34.0 PG    MCHC 32.8 30.0 - 36.5 g/dL    RDW 12.6 11.5 - 14.5 %    PLATELET 815 726 - 813 K/uL    MPV 10.2 8.9 - 12.9 FL    NRBC 0.0 0  WBC    ABSOLUTE NRBC 0.00 0.00 - 0.01 K/uL    NEUTROPHILS 36 32 - 75 %    LYMPHOCYTES 26 12 - 49 %    MONOCYTES 11 5 - 13 %    EOSINOPHILS 26 (H) 0 - 7 %    BASOPHILS 0 0 - 1 %    IMMATURE GRANULOCYTES 1 (H) 0.0 - 0.5 %    ABS. NEUTROPHILS 4.3 1.8 - 8.0 K/UL    ABS. LYMPHOCYTES 3.1 0.8 - 3.5 K/UL    ABS. MONOCYTES 1.3 (H) 0.0 - 1.0 K/UL    ABS. EOSINOPHILS 3.1 (H) 0.0 - 0.4 K/UL    ABS. BASOPHILS 0.0 0.0 - 0.1 K/UL    ABS. IMM. GRANS. 0.1 (H) 0.00 - 0.04 K/UL    DF AUTOMATED      RBC COMMENTS NORMOCYTIC, NORMOCHROMIC     METABOLIC PANEL, COMPREHENSIVE    Collection Time: 06/30/19  8:05 PM   Result Value Ref Range    Sodium 141 136 - 145 mmol/L    Potassium 4.2 3.5 - 5.1 mmol/L    Chloride 106 97 - 108 mmol/L    CO2 27 21 - 32 mmol/L    Anion gap 8 5 - 15 mmol/L    Glucose 82 65 - 100 mg/dL    BUN 12 6 - 20 MG/DL    Creatinine 0.78 0.55 - 1.02 MG/DL    BUN/Creatinine ratio 15 12 - 20      GFR est AA >60 >60 ml/min/1.73m2    GFR est non-AA >60 >60 ml/min/1.73m2    Calcium 9.1 8.5 - 10.1 MG/DL    Bilirubin, total 0.4 0.2 - 1.0 MG/DL    ALT (SGPT) 31 12 - 78 U/L    AST (SGOT) 16 15 - 37 U/L    Alk. phosphatase 94 45 - 117 U/L    Protein, total 7.4 6.4 - 8.2 g/dL    Albumin 4.1 3.5 - 5.0 g/dL    Globulin 3.3 2.0 - 4.0 g/dL    A-G Ratio 1.2 1.1 - 2.2     CK W/ REFLX CKMB    Collection Time: 06/30/19  8:05 PM   Result Value Ref Range    CK 54 26 - 192 U/L   TROPONIN I    Collection Time: 06/30/19  8:05 PM   Result Value Ref Range    Troponin-I, Qt. <0.05 <0.05 ng/mL       Radiologic Studies -   XR CHEST PORT   Final Result   IMPRESSION: No evidence of acute cardiopulmonary process. CT Results  (Last 48 hours)    None        CXR Results  (Last 48 hours)               06/30/19 2103  XR CHEST PORT Final result    Impression:  IMPRESSION: No evidence of acute cardiopulmonary process. Narrative:  INDICATION: Chest pain       COMPARISON: April 10, 2018       FINDINGS: AP portable imaging of the chest performed at 8:41 PM demonstrates a   stable cardiomediastinal silhouette. The lungs are clear bilaterally. No   significant osseous abnormalities are seen.                    Medical Decision Making   I am the first provider for this patient. I reviewed the vital signs, available nursing notes, past medical history, past surgical history, family history and social history. Vital Signs-Reviewed the patient's vital signs. Patient Vitals for the past 12 hrs:   Temp Pulse Resp BP SpO2   06/30/19 1949 98.3 °F (36.8 °C) 69 16 (!) 142/92 100 %       Pulse Oximetry Analysis - 100% on RA    Cardiac Monitor:   NSR rate 69        Records Reviewed: Nursing Notes and Old Medical Records    Provider Notes (Medical Decision Making):   Patient presents with CP. DDx:  ACS, Aortic dissection, PNA, PE, PTX, pericarditis, myocarditis, GERD, costochondritis, anxiety. Most likely GERD given the HPI and Physical exam. Will obtain labs, CXR, EKG. - I have re-examined the patient and the patient denies chest pain on re-examination. The patient has had onset of chest pain greater than 8 hours and one negative set of cardiac enzymes or 2 negative sets of cardiac enzymes in the ER during this visit. The diagnosis, follow up, return instructions, test results, x-rays and medications have been discussed and reviewed with the patient. The patient has been given the opportunity to ask questions. The patient  expresses understanding of the diagnosis, follow-up and return instructions. The patient agrees to follow up with primary care or cardiology as directed and to return immediately if the chest pain worsens. The patient expresses understanding that although cardiac testing at this time is negative, a cardiac problem could still be present and that a follow-up appointment for further evaluation and risk factor modification is necessary to complete the evaluation of this complaint. ED Course:   Initial assessment performed. The patients presenting problems have been discussed, and they are in agreement with the care plan formulated and outlined with them. I have encouraged them to ask questions as they arise throughout their visit.            Critical Care Time:   none    Disposition:  DISCHARGE NOTE  Patients results have been reviewed with them. Patient and/or family have verbally conveyed their understanding and agreement of the patient's signs, symptoms, diagnosis, treatment and prognosis and additionally agree to follow up as recommended or return to the Emergency Room should their condition change or have any new concerns prior to their follow-up appointment. Patient verbally agrees with the care-plan and verbally conveys that all of their questions have been answered. Discharge instructions have also been provided to the patient with some educational information regarding their diagnosis as well a list of reasons why they would want to return to the ER prior to their follow-up appointment should their condition change. PLAN:  1. Discharge Medication List as of 6/30/2019  9:27 PM      START taking these medications    Details   famotidine (PEPCID) 20 mg tablet Take 1 Tab by mouth two (2) times a day for 10 days. , Normal, Disp-20 Tab, R-0      sucralfate (CARAFATE) 100 mg/mL suspension Take 10 mL by mouth four (4) times daily. , Normal, Disp-500 mL, R-0         CONTINUE these medications which have NOT CHANGED    Details   butalbital-acetaminophen-caff (FIORICET) -40 mg per capsule Take 1 Cap by mouth every six (6) hours as needed for Pain or Headache., Print, Disp-6 Cap, R-0      lisinopril (PRINIVIL, ZESTRIL) 20 mg tablet Take 1 Tab by mouth daily. , Normal, Disp-14 Tab, R-0      atorvastatin (LIPITOR) 10 mg tablet take 1 tablet by mouth once daily, Normal, Disp-30 Tab, R-5      levothyroxine (SYNTHROID) 150 mcg tablet Take 1 Tab by mouth Daily (before breakfast). , Normal, Disp-90 Tab, R-1      multivitamin (ONE A DAY) tablet Take 1 Tab by mouth daily. , Historical Med           2.    Follow-up Information     Follow up With Specialties Details Why Contact Za Wang MD Family Practice Schedule an appointment as soon as possible for a visit  [address] [facility]  [address]  [phone]      [facility] EMERGENCY DEPT Emergency Medicine  If symptoms worsen [address] [place] [address]  [phone]          Return to ED if worse     Diagnosis     Clinical Impression:   1. Chest pain, unspecified type        Attestations:   This note was completed by Jemma Kunz DO

## 2019-07-01 NOTE — DISCHARGE INSTRUCTIONS

## 2019-07-01 NOTE — ED NOTES
Pt given discharge instructions by Dr. Jamar Jacinto. Saline lock removed. Discharged ambulatory with steady gait, declines wheelchair. No acute distress at time of departure.

## 2021-10-01 ENCOUNTER — HOSPITAL ENCOUNTER (OUTPATIENT)
Dept: GENERAL RADIOLOGY | Age: 51
Discharge: HOME | End: 2021-10-01
Attending: FAMILY MEDICINE
Payer: COMMERCIAL

## 2021-10-01 ENCOUNTER — TRANSCRIBE ORDER (OUTPATIENT)
Dept: GENERAL RADIOLOGY | Age: 51
End: 2021-10-01

## 2021-10-01 DIAGNOSIS — M54.2 CERVICALGIA: Primary | ICD-10-CM

## 2021-10-01 DIAGNOSIS — M54.2 CERVICALGIA: ICD-10-CM

## 2021-10-01 PROCEDURE — 72050 X-RAY EXAM NECK SPINE 4/5VWS: CPT

## 2021-11-18 ENCOUNTER — TRANSCRIBE ORDER (OUTPATIENT)
Dept: SCHEDULING | Age: 51
End: 2021-11-18

## 2021-11-18 DIAGNOSIS — M54.10 RADICULOPATHY, SITE UNSPECIFIED: Primary | ICD-10-CM

## 2021-12-10 ENCOUNTER — HOSPITAL ENCOUNTER (OUTPATIENT)
Dept: MRI IMAGING | Age: 51
Discharge: HOME | End: 2021-12-10
Attending: FAMILY MEDICINE
Payer: COMMERCIAL

## 2021-12-10 DIAGNOSIS — M54.10 RADICULOPATHY, SITE UNSPECIFIED: ICD-10-CM

## 2021-12-10 PROCEDURE — 72141 MRI NECK SPINE W/O DYE: CPT

## 2022-02-03 ENCOUNTER — OFFICE VISIT (OUTPATIENT)
Dept: ORTHOPEDIC SURGERY | Age: 52
End: 2022-02-03
Payer: COMMERCIAL

## 2022-02-03 VITALS — BODY MASS INDEX: 39.27 KG/M2 | WEIGHT: 230 LBS | HEIGHT: 64 IN

## 2022-02-03 DIAGNOSIS — M54.42 LOW BACK PAIN WITH LEFT-SIDED SCIATICA, UNSPECIFIED BACK PAIN LATERALITY, UNSPECIFIED CHRONICITY: ICD-10-CM

## 2022-02-03 DIAGNOSIS — M51.36 DDD (DEGENERATIVE DISC DISEASE), LUMBAR: Primary | ICD-10-CM

## 2022-02-03 DIAGNOSIS — M50.30 DDD (DEGENERATIVE DISC DISEASE), CERVICAL: ICD-10-CM

## 2022-02-03 DIAGNOSIS — M43.10 RETROLISTHESIS OF VERTEBRAE: ICD-10-CM

## 2022-02-03 DIAGNOSIS — M54.2 NECK PAIN: ICD-10-CM

## 2022-02-03 PROCEDURE — 99204 OFFICE O/P NEW MOD 45 MIN: CPT | Performed by: ORTHOPAEDIC SURGERY

## 2022-02-03 RX ORDER — MELOXICAM 15 MG/1
15 TABLET ORAL DAILY
Qty: 30 TABLET | Refills: 0 | Status: SHIPPED | OUTPATIENT
Start: 2022-02-03 | End: 2022-04-21 | Stop reason: CLARIF

## 2022-02-03 RX ORDER — CYCLOBENZAPRINE HCL 5 MG
5 TABLET ORAL
Qty: 30 TABLET | Refills: 1 | Status: SHIPPED | OUTPATIENT
Start: 2022-02-03

## 2022-02-03 RX ORDER — GABAPENTIN 300 MG/1
CAPSULE ORAL
Qty: 30 CAPSULE | Refills: 1 | Status: SHIPPED | OUTPATIENT
Start: 2022-02-03

## 2022-02-03 RX ORDER — MONTELUKAST SODIUM 10 MG/1
10 TABLET ORAL DAILY
COMMUNITY

## 2022-02-03 RX ORDER — METHYLPREDNISOLONE 4 MG/1
TABLET ORAL
Qty: 1 DOSE PACK | Refills: 0 | Status: SHIPPED | OUTPATIENT
Start: 2022-02-03

## 2022-02-03 NOTE — PATIENT INSTRUCTIONS

## 2022-02-04 NOTE — PROGRESS NOTES
Jacqueline Hannah (: 1970) is a 49 y.o. female, patient, here for evaluation of the following chief complaint(s):  Neck Pain (Cervical MRI 12/10/2021) and Back Pain       ASSESSMENT/PLAN:  Below is the assessment and plan developed based on review of pertinent history, physical exam, labs, studies, and medications. Patient presents today for evaluation of chronic neck pain as well as acute low back pain with left lower extremity radiculopathy. Radiologic findings reviewed in detail with the patient. Cervical MRI reviewed with the patient, no significant compressive pathology. Patient denies upper extremity radicular symptoms. No weakness noted on physical exam.  Patient would prefer to try physical therapy prior to any ROSALBA's. Referral to physical therapy provided today. Additionally, refill of daily Mobic as well as as needed Flexeril sent to pharmacy. She will follow-up after physical therapy. Radiologic findings reviewed in detail of the low back. She notes an acute episode of low back pain with left lower extremity radiculopathy. Patient prescribed daily gabapentin as well as a Medrol Dosepak. Discussed with the patient that she will hold Mobic while taking the Medrol Dosepak. Lumbar exercises given to the patient. 1. DDD (degenerative disc disease), lumbar  -     gabapentin (NEURONTIN) 300 mg capsule; 1-2 tabs at bedtime, Normal, Disp-30 Capsule, R-1  2. Low back pain with left-sided sciatica, unspecified back pain laterality, unspecified chronicity  -     XR SPINE LUMB MIN 4 V; Future  3. Neck pain  -     REFERRAL TO PHYSICAL THERAPY; Future  4. Retrolisthesis of vertebrae  5. DDD (degenerative disc disease), cervical      No follow-ups on file. SUBJECTIVE/OBJECTIVE:  Jacqueline Hannah (: 1970) is a 49 y.o. female. No flowsheet data found. Patient presents today for evaluation of chronic neck and acute low back pain with left lower extremity radiculopathy.   She has a history of intermittent chronic neck pain. Her most significant complaint is decreased range of motion, which she feels is due to bilateral muscle tightness in her neck. Denies any upper extremity radicular symptoms. She states that the symptoms have significantly worsened over the past few weeks. She had physical therapy a few years ago which she said helped for a while, and she is trying to keep up with her home exercise. She is currently taking meloxicam with some relief. She was put on a steroid pack during her last flareup of her symptoms which she stated helped. She denies any upper extremity weakness. She does not know any significant changes in her balance. She brings an MRI in for review today. Additionally, she reports an increase in low back pain, with pain radiating down the posterior aspect of the left lower extremity into the posterior calf over the last few weeks. She is not taking any medications at this time for this issue. Denies changes in bowel or bladder control. Denies lower extremity weakness. She has not been to physical therapy for her low back. Imaging:    XR Results (most recent):  Results from Appointment encounter on 02/03/22    XR SPINE LUMB MIN 4 V    Narrative  AP, lateral, flexion, and extension films of the lumbar spine reveal no evidence of acute fracture or lytic lesion. There is well-maintained lumbar lordosis. There is presence of moderate multilevel disc degeneration and spondylosis. There is a minimal retrolisthesis at L5-S1 without significant movement with flexion or extension views. MRI Results (most recent):  Results from [place] encounter on 12/10/21    MRI CERV SPINE WO CONT    Narrative  EXAM:  MRI CERV SPINE WO CONT    INDICATION:   Radiculopathy    COMPARISON: None. TECHNIQUE: MR imaging of the cervical spine was performed including sagittal T1,  T2, STIR;  axial GRE, T2, T1. Contrast was not administered.     FINDINGS: There is mucosal thickening right maxillary sinus  There is normal alignment of the cervical spine. Vertebral body heights are  maintained. Marrow signal is within normal limits. There is a hemangioma in C6  vertebral body. .  The craniocervical junction is intact. The course, caliber,  and signal intensity of the spinal cord are normal.    C2/3:   The spinal canal and neural foramina are widely patent. C3/4:   The spinal canal and neural foramina are widely patent. There is minor  bulging of the disc    C4/5:   The spinal canal and neural foramina are widely patent. C5/6:  There is a small osteophyte/disc complex more prominent on the left with  without evidence of central stenosis. Dilip Brien Neural foramina are patent. C6/7:   The spinal canal and neural foramina are widely patent. There is minor  bulging of the disc.    C7/T1:   The spinal canal and neural foramina are widely patent. Impression  1. At C5-C6, there is a small osteophyte/disc complex more prominent on the left  without evidence of central stenosis. Neural foramina appear patent. Allergies   Allergen Reactions    Codeine Nausea and Vomiting     severe N&V       Current Outpatient Medications   Medication Sig    montelukast (SINGULAIR) 10 mg tablet Take 10 mg by mouth daily.  methylPREDNISolone (MEDROL DOSEPACK) 4 mg tablet Per dose pack instructions    gabapentin (NEURONTIN) 300 mg capsule 1-2 tabs at bedtime    meloxicam (Mobic) 15 mg tablet Take 1 Tablet by mouth daily.  cyclobenzaprine (FLEXERIL) 5 mg tablet Take 1 Tablet by mouth three (3) times daily as needed for Muscle Spasm(s).  lisinopril (PRINIVIL, ZESTRIL) 20 mg tablet Take 1 Tab by mouth daily.  atorvastatin (LIPITOR) 10 mg tablet take 1 tablet by mouth once daily    levothyroxine (SYNTHROID) 150 mcg tablet Take 1 Tab by mouth Daily (before breakfast).  multivitamin (ONE A DAY) tablet Take 1 Tab by mouth daily.     sucralfate (CARAFATE) 100 mg/mL suspension Take 10 mL by mouth four (4) times daily.  butalbital-acetaminophen-caff (FIORICET) -40 mg per capsule Take 1 Cap by mouth every six (6) hours as needed for Pain or Headache. No current facility-administered medications for this visit. Past Medical History:   Diagnosis Date    Hypercholesterolemia     Hypertension     JULIAN on CPAP     Rosacea         Past Surgical History:   Procedure Laterality Date    HX  SECTION  2008    HX CHOLECYSTECTOMY  2006    HX HYSTERECTOMY  2012       Family History   Problem Relation Age of Onset    Cancer Maternal Grandmother         colon    Cancer Maternal Grandfather         lung    Heart Disease Paternal Grandfather     Diabetes Mother     Hypertension Mother     Stroke Mother     Hypertension Father     High Cholesterol Father         Social History     Tobacco Use    Smoking status: Never Smoker    Smokeless tobacco: Never Used   Substance Use Topics    Alcohol use: Yes     Alcohol/week: 0.0 standard drinks     Comment: occasionally    Drug use: Not on file        Review of Systems   Constitutional: Negative for chills, fatigue and fever. Respiratory: Negative for cough, shortness of breath and wheezing. Cardiovascular: Negative for chest pain and palpitations. Gastrointestinal: Negative for nausea and vomiting. Musculoskeletal: Positive for arthralgias, back pain, neck pain and neck stiffness. Negative for gait problem. Neurological: Negative for dizziness, weakness, numbness and headaches. Psychiatric/Behavioral: Negative for confusion and hallucinations. Vitals:  Ht 5' 4\" (1.626 m)   Wt 230 lb (104.3 kg)   BMI 39.48 kg/m²    Body mass index is 39.48 kg/m². Physical Exam  Neurologic  Sensory  Light Touch - Intact - Globally. Overall Assessment of Muscle Strength and Tone reveals  Lower Extremities - Right Iliopsoas - 5/5. Left Iliopsoas - 5/5. Right Tibialis Anterior - 5/5. Left Tibialis Anterior - 5/5.  Right Gastroc-Soleus - 5/5. Left Gastroc-Soleus - 5/5. Right EHL - 5/5. Left EHL - 5/5. General Assessment of Reflexes  Right Ankle - Clonus is not present. Left Ankle - Clonus is not present. Reflexes (Dermatomes)  2/2 Normal - Left Achilles (L5-S2), Left Knee (L2-4), Right Achilles (L5-S2) and Right Knee (L2-4). Overall Assessment of Muscle Strength and Tone reveals  Upper Extremities - Right Deltoid - 5/5. Left Deltoid - 5/5. Right Bicep - 5/5. Left Bicep - 5/5. Right Tricep - 5/5. Left Tricep - 5/5. Right Wrist Extensors - 5/5. Left Wrist Extensors - 5/5. Right Wrist Flexors - 5/5. Left Wrist Flexors - 5/5. Right Intrinsics - 5/5. Left Intrinsics - 5/5. General Assessment of Reflexes  Right Hand - Hernandez's sign is negative in the right hand. Left Hand - Hernandez's sign is negative in the left hand. Reflexes (Dermatomes)  2/2 Normal - Left Bicep (C5-6), Left Tricep (C7-8), Left Brachioradialis (C5-6), Right Bicep (C5-6), Right Tricep (C7-8) and Right Brachioradialis (C5-6). Musculoskeletal  Global Assessment  Examination of related systems reveals - well-developed, well-nourished, in no acute distress, alert and oriented x 3. Gait and Station - normal gait and station and normal posture. Right Lower Extremity - normal strength and tone, normal range of motion without pain and no instability, subluxation or laxity. Left Lower Extremity - normal strength and tone, normal range of motion without pain and no instability, subluxation or laxity. Spine/Ribs/Pelvis  Cervical Spine -Cervical Spine - Evaluation of related systems reveals - no lymphadenopathy and neurovascularly intact bilaterally. Inspection and Palpation - Tenderness - moderate and localized bilateral paraspinal and upper trapezius muscles. Assessment of pain reveals the following findings: - Location - cervical area. ROJM - Flexion - 85 °. Right Lateral Flexion - 35 °. Left Lateral Flexion - 35 °. Extension - 70 °. Right Rotation - 70 °.  Left Rotation - 70 °. . Thoracic (Dorsal) Spine - Examination of the thoracic spine reveals - no tenderness over thoracic vertebrae, no pain, normal sensation and normal thoracic spine movements. Lumbosacral Spine - Examination of the lumbosacral spine reveals - no known fractures or deformities. Inspection and Palpation - Tenderness - moderate. Assessment of pain reveals the following findings - The pain is characterized as - moderate. Location - pain refers to lower back bilaterally. ROJM - Trunk Extension - 15 degrees. Lumbar Spine Flexion - 35 °. Lumbosacral Spine - Functional Testing - Babinski Test negative, Prone Knee Bending Test negative, Slump Test negative, Straight Leg Raising Test negative. Dr. Lorraine Dias was available for immediate consult during this encounter. An electronic signature was used to authenticate this note.   -- MARICHUY Gamez

## 2022-02-11 NOTE — PROGRESS NOTES
Eboni Spring (: 1970) is a 49 y.o. female patient here for evaluation of the following chief complaint(s):  Back Pain and Neck Pain       Patient Name: Eboni Spring  Date:2022  : 1970  [x]  Patient  Verified  Payor: Monica Adonay / Plan: Trerajendra Varmas [id number] PPO / Product Type: PPO /    In time: 12:40 Out time: 1:30  Total Treatment Time (min): 50  Total Timed Codes (min): 45  1:1 Treatment Time (MC only): NA   Visit #: 1 of cheryl Carvajal:  HPI    Patient presents today for evaluation of chronic neck pain as well as acute low back pain with left lower extremity radiculopathy. Patient reports some numbness when sleeping at night but this is intermittent. No weakness noted on physical exam.  She notes an acute episode of low back pain with left lower extremity radiculopathy. Patient reports she was reaching for a stack of papers towards the right and felt a grabbing sensation in her low back. Currently she reports stiffness of her neck with sitting, driving, looking right/left. No report of upper extremity weakness. Regarding the back, the patient has increased pain when lying flat on her back, with bending forward, and lifting. 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 Physical Exam    Physical exam: Spine.     Range of motion:  Trunk extension-limited, able, no pain  lumbar flexion -limited, increased pain, patient can only reach thigh level  Lateral sidebend-able, limited bilaterally  Right/left rotation-not assessed    Cervical range: 45 degrees rotation bilaterally, 18 to 20 degrees sidebending bilaterally    MMT- on right  Hip flex/ext- 5/5  Hip IR/ER- 5/5  Hip abd- 5/5  Knee flex/ext- 5/5  Heel/toe walking- able    MMT- on left  Hip flex/ext- 5/5  Hip IR/ER- 5/5  Hip abd- 5/5  Knee flex/ext- 5/5  Heel/toe walking- able    Flexibility:   90-90 Hamstring flexibility-limited bilaterally, moderate deficit  Hip flexors (Laci test)-moderate deficit  Quadriceps-moderate deficit    Neurologic exam  Dermatomes-all upper and lower extremity dermatomes intact to light touch    Functional mobility: patient is hypomobile of the lumbar segments L1-L5, limited flexibility of the upper trapezius and levator scapular region bilaterally, limited suboccipitals    Posture: Increased thoracic kyphosis, increased forward head positioning. Increased anterior pelvic tilt in both standing, walking, and sitting. Special tests:  Neural tension   Slump- NT  SLR-negative bilaterally    NDI: 10    Therapeutic exercise performed: Cervical rotation, thoracic extension in sitting, posterior pelvic tilt in sitting, upper trapezius stretching and pec stretching bilaterally. Had a discussion on proper posture when sitting at her desk during the workday and use of a lumbar roll to maintain lumbar lordosis. Finished with heat to the low back and cervical spine for 10 minutes. Instructed on her home program.      An electronic signature was used to authenticate this note. -- Jared Mcnamara, PT       ASSESSMENT/PLAN:  Below is the assessment and plan developed based on review of pertinent history, physical exam, labs, studies, and medications. 1. Low back pain with left-sided sciatica, unspecified back pain laterality, unspecified chronicity  2. Neck pain  -     REFERRAL TO PHYSICAL THERAPY      Return in about 2 weeks (around 2/28/2022) for low back, neck. The patient is a  50-year-old female presenting with a history of low back and neck pain, as well as left lumbar radiculopathy.       She currently has the following physical therapy impairments: slightly increased central low back pain, loss of ROM into lumbar flexion and extension, side bending, and rotation; moderate loss of quadriceps, hamstring, and hip flexor flexibility; inability to squat, bend forward, walk or stand for prolonged periods of time; increased thoracic kyphosis, poor posture, increased lumbar lordosis/anterior pelvic tilt in standing;  and decreased tolerance to daily activity and ambulation. She would benefit from skilled physical therapy services in order to address the above impairments to allow for full functional return and return to recreational activities. Reviewed a home program with the patient, focus will be given to addressing lumbar and cervical spine range of motion deficits, flexibility restrictions, core muscle weakness and poor posture, and working towards squatting and weight bearing exercises as appropriate. The patient appeared to understand the plan of care and was in agreement on the home program. She will attend physical therapy for 1-2 visits a week, for 1 month in order to address the above impairments. Goals to be met in 4-6 weeks:  1. Patient will improve  tolerance to lumbar flexion, extension, sidebending, and rotation in order to perform daily activities  2. Patient will  be able to ambulate for greater than 30 minutes without pain of the low back and without fatigue  3. Patient will improve in her cervical spine rotation by 10 to 15 degrees to allow for improved functional activity including driving  4. Patient will  improve flexibility of the hamstrings, quadriceps, and hip flexors to improve tolerance to daily activity  5. The patient will be independent with a HEP  6. The patient will be able to tolerate lying supine without low back pain        An electronic signature was used to authenticate this note.   -- Juana Cabrales, PT

## 2022-02-14 ENCOUNTER — OFFICE VISIT (OUTPATIENT)
Dept: ORTHOPEDIC SURGERY | Age: 52
End: 2022-02-14
Payer: COMMERCIAL

## 2022-02-14 DIAGNOSIS — M54.2 NECK PAIN: ICD-10-CM

## 2022-02-14 DIAGNOSIS — M54.42 LOW BACK PAIN WITH LEFT-SIDED SCIATICA, UNSPECIFIED BACK PAIN LATERALITY, UNSPECIFIED CHRONICITY: Primary | ICD-10-CM

## 2022-02-14 PROCEDURE — 97162 PT EVAL MOD COMPLEX 30 MIN: CPT | Performed by: PHYSICAL THERAPIST

## 2022-02-14 PROCEDURE — 97110 THERAPEUTIC EXERCISES: CPT | Performed by: PHYSICAL THERAPIST

## 2022-03-04 ENCOUNTER — OFFICE VISIT (OUTPATIENT)
Dept: ORTHOPEDIC SURGERY | Age: 52
End: 2022-03-04
Payer: COMMERCIAL

## 2022-03-04 DIAGNOSIS — M54.42 LOW BACK PAIN WITH LEFT-SIDED SCIATICA, UNSPECIFIED BACK PAIN LATERALITY, UNSPECIFIED CHRONICITY: Primary | ICD-10-CM

## 2022-03-04 DIAGNOSIS — M54.2 NECK PAIN: ICD-10-CM

## 2022-03-04 PROCEDURE — 97110 THERAPEUTIC EXERCISES: CPT | Performed by: PHYSICAL THERAPIST

## 2022-03-04 PROCEDURE — 97140 MANUAL THERAPY 1/> REGIONS: CPT | Performed by: PHYSICAL THERAPIST

## 2022-03-04 NOTE — PROGRESS NOTES
Gretta Adan (: 1970) is a 49 y.o. female patient here for evaluation of the following chief complaint(s):  Back Pain       Patient Name: Gretta Adan  Date:3/4/2022  : 1970  [x]  Patient  Verified  Payor: Azra Stain / Plan: Treinta LENA Varmas [id number] PPO / Product Type: PPO /    In time: 8:20 Out time: 9:15  Total Treatment Time (min): 55  Total Timed Codes (min): 50  1:1 Treatment Time ( only): NA   Visit #: 2 of [facility]      ASSESSMENT/PLAN:  Below is the assessment and plan developed based on review of pertinent history, physical exam, labs, studies, and medications. 1. Low back pain with left-sided sciatica, unspecified back pain laterality, unspecified chronicity  2. Neck pain      Return in about 1 week (around 3/11/2022) for continued therapy for back. Patient is doing better. She has improved mobility of her neck and has been consistent in working on her posture and to be more mindful throughout the workday. We progressed her core strengthening and standing and performed lumbar extensions, she better tolerates extension. Instructed on an updated program and provided a printout. The patient will follow up next week. SUBJECTIVE/OBJECTIVE:  HPI    Patient reports she \"pulled her back out\" again earlier this week when bending over to  a rubber band. It's doing better today. The neck exercises are going well. Physical Exam    Objective:    ROM: cervical rotation- >45 degrees from last visit    Function/Strength: better with lumbar extension    Manual performed: STM spinal extensors and UT/LS region bilaterally; cervical rotation and mobilization (20 min)    Therapeutic exercise performed: Cervical rotation, thoracic extension in sitting, posterior pelvic tilt in sitting, upper trapezius stretching and pec stretching bilaterally. Progressed with standing lumbar extension, standing core pull downs, scap squeezes against wall.  30 min  Had a discussion on proper posture when sitting at her desk during the workday and to continue to use of a lumbar roll to maintain lumbar lordosis. An electronic signature was used to authenticate this note.   -- Kvng Pacheco, PT

## 2022-03-11 ENCOUNTER — OFFICE VISIT (OUTPATIENT)
Dept: ORTHOPEDIC SURGERY | Age: 52
End: 2022-03-11
Payer: COMMERCIAL

## 2022-03-11 DIAGNOSIS — M54.2 NECK PAIN: Primary | ICD-10-CM

## 2022-03-11 DIAGNOSIS — M54.42 LOW BACK PAIN WITH LEFT-SIDED SCIATICA, UNSPECIFIED BACK PAIN LATERALITY, UNSPECIFIED CHRONICITY: ICD-10-CM

## 2022-03-11 PROCEDURE — 97110 THERAPEUTIC EXERCISES: CPT | Performed by: PHYSICAL THERAPIST

## 2022-03-11 PROCEDURE — 97140 MANUAL THERAPY 1/> REGIONS: CPT | Performed by: PHYSICAL THERAPIST

## 2022-03-11 NOTE — PROGRESS NOTES
Josse Brunner (: 1970) is a 49 y.o. female patient here for evaluation of the following chief complaint(s):  Back Pain       Patient Name: Josse Brunner  Date:3/11/2022  : 1970  [x]  Patient  Verified  Payor: Albertha Osler / Plan: Endy Anguiano [id number] PPO / Product Type: PPO /    In time: 8:40 Out time: 9:45  Total Treatment Time (min): 65  Total Timed Codes (min): 50  1:1 Treatment Time ( only): NA   Visit #: 3 of [facility]      ASSESSMENT/PLAN:  Below is the assessment and plan developed based on review of pertinent history, physical exam, labs, studies, and medications. 1. Neck pain  2. Low back pain with left-sided sciatica, unspecified back pain laterality, unspecified chronicity      Return in about 1 week (around 3/18/2022), or if symptoms worsen or fail to improve, for continued therapy for back. Patient is doing better. She has improved mobility of her neck but is still limited into cervical rotation bilaterally. She does exhibit moderate restrictions to the suboccipitals and UT and LS region bilaterally. 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 We progressed her core strengthening and standing and performed lumbar extensions, she better tolerates extension. Instructed on an updated program and provided a printout. SUBJECTIVE/OBJECTIVE:  HPI    Patient reports she has not experienced any low back pain/twinges in the last week. She did experiencing neck tightness but has been working on the computer frequently throughout the work week . Physical Exam    Objective:    ROM: cervical rotation- >45 degrees from last visit    Function/Strength: better with lumbar extension. The patient is limited bilaterally into cervical rotation and exhibits moderate soft tissue restrictions of the suboccipitals, UT and LS region bilaterally.     Manual performed: STM spinal extensors and UT/LS region bilaterally; cervical rotation and mobilization (20 min)    Therapeutic exercise performed: Cervical rotation in supine, thoracic extension in sitting, core presses, upper trapezius stretching and pec stretching bilaterally, standing lumbar extension, standing core pull downs, scap squeezes against wall. 30 min  Had a discussion on being consistent in working to address suboccipital, upper trapezius and levator scapula restrictions throughout the work week, either with a tennis ball or her fingers. An electronic signature was used to authenticate this note.   -- Narciso Carl, PT

## 2022-03-17 ENCOUNTER — OFFICE VISIT (OUTPATIENT)
Dept: ORTHOPEDIC SURGERY | Age: 52
End: 2022-03-17
Payer: COMMERCIAL

## 2022-03-17 VITALS — BODY MASS INDEX: 39.27 KG/M2 | HEIGHT: 64 IN | WEIGHT: 230 LBS

## 2022-03-17 DIAGNOSIS — M50.90 CERVICAL DISC DISEASE: ICD-10-CM

## 2022-03-17 DIAGNOSIS — M48.061 DEGENERATIVE LUMBAR SPINAL STENOSIS: Primary | ICD-10-CM

## 2022-03-17 DIAGNOSIS — G89.29 CHRONIC LEFT-SIDED LOW BACK PAIN WITHOUT SCIATICA: ICD-10-CM

## 2022-03-17 DIAGNOSIS — M54.50 CHRONIC LEFT-SIDED LOW BACK PAIN WITHOUT SCIATICA: ICD-10-CM

## 2022-03-17 PROCEDURE — 99214 OFFICE O/P EST MOD 30 MIN: CPT | Performed by: ORTHOPAEDIC SURGERY

## 2022-03-17 RX ORDER — MELOXICAM 15 MG/1
15 TABLET ORAL DAILY
Qty: 30 TABLET | Refills: 1 | Status: SHIPPED | OUTPATIENT
Start: 2022-03-17 | End: 2022-04-21 | Stop reason: ALTCHOICE

## 2022-03-17 NOTE — PROGRESS NOTES
1. Have you been to the ER, urgent care clinic since your last visit? Hospitalized since your last visit? No    2. Have you seen or consulted any other health care providers outside of the [address] [place] [address] since your last visit? Include any pap smears or colon screening.  No    Chief Complaint   Patient presents with    Back Pain    Neck Pain

## 2022-03-17 NOTE — PROGRESS NOTES
Milton Samuels (: 1970) is a 49 y.o. female, patient, here for evaluation of the following chief complaint(s):  Back Pain and Neck Pain       ASSESSMENT/PLAN:  Below is the assessment and plan developed based on review of pertinent history, physical exam, labs, studies, and medications. Patient presents today for evaluation of chronic neck pain as well as acute low back pain with left lower extremity radiculopathy. Radiologic findings reviewed in detail with the patient. Cervical MRI reviewed with the patient, no significant compressive pathology. He is completed 6 weeks of physical therapy. She still having both neck pain and lower back pain. The lower back pain is in the left buttock region with left leg with radiation to left lateral calf. She does take the Neurontin but that has given her some side effects which she had discontinued that. She does take occasional muscle relaxant. As well as the Mobic. I will order MRI of her lumbar spine to see if she is a candidate for lumbar injections. Renewed her Mobic prescription today. 1. Degenerative lumbar spinal stenosis  -     MRI LUMB SPINE WO CONT; Future  -     meloxicam (Mobic) 15 mg tablet; Take 1 Tablet by mouth daily. , Normal, Disp-30 Tablet, R-1  2. Cervical disc disease  3. Chronic left-sided low back pain without sciatica      No follow-ups on file. SUBJECTIVE/OBJECTIVE:  Milton Samuels (: 1970) is a 49 y.o. female. No flowsheet data found. Patient presents today for evaluation of chronic neck and acute low back pain with left lower extremity radiculopathy. She has a history of intermittent chronic neck pain. Her most significant complaint is decreased range of motion, which she feels is due to bilateral muscle tightness in her neck. Denies any upper extremity radicular symptoms. She states that the symptoms have significantly worsened over the past few weeks.   She had physical therapy a few years ago which she said helped for a while, and she is trying to keep up with her home exercise. She is currently taking meloxicam with some relief. She was put on a steroid pack during her last flareup of her symptoms which she stated helped. She denies any upper extremity weakness. She does not know any significant changes in her balance. She brings an MRI in for review today. Additionally, she reports an increase in low back pain, with pain radiating down the posterior aspect of the left lower extremity into the posterior calf over the last few weeks. She is not taking any medications at this time for this issue. Denies changes in bowel or bladder control. Denies lower extremity weakness. She has not been to physical therapy for her low back. Imaging:    XR Results (most recent):  Results from Appointment encounter on 02/03/22    XR SPINE LUMB MIN 4 V    Narrative  AP, lateral, flexion, and extension films of the lumbar spine reveal no evidence of acute fracture or lytic lesion. There is well-maintained lumbar lordosis. There is presence of moderate multilevel disc degeneration and spondylosis. There is a minimal retrolisthesis at L5-S1 without significant movement with flexion or extension views. MRI Results (most recent):  Results from [place] encounter on 12/10/21    MRI CERV SPINE WO CONT    Narrative  EXAM:  MRI CERV SPINE WO CONT    INDICATION:   Radiculopathy    COMPARISON: None. TECHNIQUE: MR imaging of the cervical spine was performed including sagittal T1,  T2, STIR;  axial GRE, T2, T1. Contrast was not administered. FINDINGS: There is mucosal thickening right maxillary sinus  There is normal alignment of the cervical spine. Vertebral body heights are  maintained. Marrow signal is within normal limits. There is a hemangioma in C6  vertebral body. .  The craniocervical junction is intact.   The course, caliber,  and signal intensity of the spinal cord are normal.    C2/3: The spinal canal and neural foramina are widely patent. C3/4:   The spinal canal and neural foramina are widely patent. There is minor  bulging of the disc    C4/5:   The spinal canal and neural foramina are widely patent. C5/6:  There is a small osteophyte/disc complex more prominent on the left with  without evidence of central stenosis. Teri Red Neural foramina are patent. C6/7:   The spinal canal and neural foramina are widely patent. There is minor  bulging of the disc.    C7/T1:   The spinal canal and neural foramina are widely patent. Impression  1. At C5-C6, there is a small osteophyte/disc complex more prominent on the left  without evidence of central stenosis. Neural foramina appear patent. Allergies   Allergen Reactions    Codeine Nausea and Vomiting     severe N&V       Current Outpatient Medications   Medication Sig    meloxicam (Mobic) 15 mg tablet Take 1 Tablet by mouth daily.  montelukast (SINGULAIR) 10 mg tablet Take 10 mg by mouth daily.  methylPREDNISolone (MEDROL DOSEPACK) 4 mg tablet Per dose pack instructions    gabapentin (NEURONTIN) 300 mg capsule 1-2 tabs at bedtime    meloxicam (Mobic) 15 mg tablet Take 1 Tablet by mouth daily.  cyclobenzaprine (FLEXERIL) 5 mg tablet Take 1 Tablet by mouth three (3) times daily as needed for Muscle Spasm(s).  lisinopril (PRINIVIL, ZESTRIL) 20 mg tablet Take 1 Tab by mouth daily.  atorvastatin (LIPITOR) 10 mg tablet take 1 tablet by mouth once daily    levothyroxine (SYNTHROID) 150 mcg tablet Take 1 Tab by mouth Daily (before breakfast).  multivitamin (ONE A DAY) tablet Take 1 Tab by mouth daily.  sucralfate (CARAFATE) 100 mg/mL suspension Take 10 mL by mouth four (4) times daily.  butalbital-acetaminophen-caff (FIORICET) -40 mg per capsule Take 1 Cap by mouth every six (6) hours as needed for Pain or Headache. No current facility-administered medications for this visit.        Past Medical History: Diagnosis Date    Hypercholesterolemia     Hypertension     JULIAN on CPAP     Rosacea         Past Surgical History:   Procedure Laterality Date    HX  SECTION  2008    HX CHOLECYSTECTOMY  2006    HX HYSTERECTOMY  2012       Family History   Problem Relation Age of Onset    Cancer Maternal Grandmother         colon    Cancer Maternal Grandfather         lung    Heart Disease Paternal Grandfather     Diabetes Mother     Hypertension Mother     Stroke Mother     Hypertension Father     High Cholesterol Father         Social History     Tobacco Use    Smoking status: Never Smoker    Smokeless tobacco: Never Used   Substance Use Topics    Alcohol use: Yes     Alcohol/week: 0.0 standard drinks     Comment: occasionally    Drug use: Not on file        Review of Systems   Constitutional: Negative for chills, fatigue and fever. Respiratory: Negative for cough, shortness of breath and wheezing. Cardiovascular: Negative for chest pain and palpitations. Gastrointestinal: Negative for nausea and vomiting. Musculoskeletal: Positive for arthralgias, back pain, neck pain and neck stiffness. Negative for gait problem. Neurological: Negative for dizziness, weakness, numbness and headaches. Psychiatric/Behavioral: Negative for confusion and hallucinations. All other systems reviewed and are negative. Vitals:  Ht 5' 4\" (1.626 m)   Wt 230 lb (104.3 kg)   BMI 39.48 kg/m²    Body mass index is 39.48 kg/m². Physical Exam  Neurologic  Sensory  Light Touch - Intact - Globally. Overall Assessment of Muscle Strength and Tone reveals  Lower Extremities - Right Iliopsoas - 5/5. Left Iliopsoas - 5/5. Right Tibialis Anterior - 5/5. Left Tibialis Anterior - 5/5. Right Gastroc-Soleus - 5/5. Left Gastroc-Soleus - 5/5. Right EHL - 5/5. Left EHL - 5/5. General Assessment of Reflexes  Right Ankle - Clonus is not present. Left Ankle - Clonus is not present.   Reflexes (Dermatomes)  2/2 Normal - Left Achilles (L5-S2), Left Knee (L2-4), Right Achilles (L5-S2) and Right Knee (L2-4). Overall Assessment of Muscle Strength and Tone reveals  Upper Extremities - Right Deltoid - 5/5. Left Deltoid - 5/5. Right Bicep - 5/5. Left Bicep - 5/5. Right Tricep - 5/5. Left Tricep - 5/5. Right Wrist Extensors - 5/5. Left Wrist Extensors - 5/5. Right Wrist Flexors - 5/5. Left Wrist Flexors - 5/5. Right Intrinsics - 5/5. Left Intrinsics - 5/5. General Assessment of Reflexes  Right Hand - Hernandez's sign is negative in the right hand. Left Hand - Hernandez's sign is negative in the left hand. Reflexes (Dermatomes)  2/2 Normal - Left Bicep (C5-6), Left Tricep (C7-8), Left Brachioradialis (C5-6), Right Bicep (C5-6), Right Tricep (C7-8) and Right Brachioradialis (C5-6). Musculoskeletal  Global Assessment  Examination of related systems reveals - well-developed, well-nourished, in no acute distress, alert and oriented x 3. Gait and Station - normal gait and station and normal posture. Right Lower Extremity - normal strength and tone, normal range of motion without pain and no instability, subluxation or laxity. Left Lower Extremity - normal strength and tone, normal range of motion without pain and no instability, subluxation or laxity. Spine/Ribs/Pelvis  Cervical Spine -Cervical Spine - Evaluation of related systems reveals - no lymphadenopathy and neurovascularly intact bilaterally. Inspection and Palpation - Tenderness - moderate and localized bilateral paraspinal and upper trapezius muscles. Assessment of pain reveals the following findings: - Location - cervical area. ROJM - Flexion - 85 °. Right Lateral Flexion - 35 °. Left Lateral Flexion - 35 °. Extension - 70 °. Right Rotation - 70 °. Left Rotation - 70 °. . Thoracic (Dorsal) Spine - Examination of the thoracic spine reveals - no tenderness over thoracic vertebrae, no pain, normal sensation and normal thoracic spine movements.  Lumbosacral Spine - Examination of the lumbosacral spine reveals - no known fractures or deformities. Inspection and Palpation - Tenderness - moderate. Assessment of pain reveals the following findings - The pain is characterized as - moderate. Location - pain refers to lower back bilaterally. ROJM - Trunk Extension - 15 degrees. Lumbar Spine Flexion - 35 °. Lumbosacral Spine - Functional Testing - Babinski Test negative, Prone Knee Bending Test negative, Slump Test negative, Straight Leg Raising Test negative. Dr. Julia Magallon was available for immediate consult during this encounter. An electronic signature was used to authenticate this note.   -- MARICHUY Francisco

## 2022-03-17 NOTE — PATIENT INSTRUCTIONS
Neck: Exercises  Introduction  Here are some examples of exercises for you to try. The exercises may be suggested for a condition or for rehabilitation. Start each exercise slowly. Ease off the exercises if you start to have pain. You will be told when to start these exercises and which ones will work best for you. How to do the exercises  Neck stretch    1. This stretch works best if you keep your shoulder down as you lean away from it. To help you remember to do this, start by relaxing your shoulders and lightly holding on to your thighs or your chair. 2. Tilt your head toward your shoulder and hold for 15 to 30 seconds. Let the weight of your head stretch your muscles. 3. If you would like a little added stretch, use your hand to gently and steadily pull your head toward your shoulder. For example, keeping your right shoulder down, lean your head to the left. 4. Repeat 2 to 4 times toward each shoulder. Diagonal neck stretch    1. Turn your head slightly toward the direction you will be stretching, and tilt your head diagonally toward your chest and hold for 15 to 30 seconds. 2. If you would like a little added stretch, use your hand to gently and steadily pull your head forward on the diagonal.  3. Repeat 2 to 4 times toward each side. Dorsal glide stretch    The dorsal glide stretches the back of the neck. If you feel pain, do not glide so far back. Some people find this exercise easier to do while lying on their backs with an ice pack on the neck. 1. Sit or stand tall and look straight ahead. 2. Slowly tuck your chin as you glide your head backward over your body  3. Hold for a count of 6, and then relax for up to 10 seconds. 4. Repeat 8 to 12 times. Chest and shoulder stretch    1. Sit or stand tall and glide your head backward as in the dorsal glide stretch. 2. Raise both arms so that your hands are next to your ears.   3. Take a deep breath, and as you breathe out, lower your elbows down and behind your back. You will feel your shoulder blades slide down and together, and at the same time you will feel a stretch across your chest and the front of your shoulders. 4. Hold for about 6 seconds, and then relax for up to 10 seconds. 5. Repeat 8 to 12 times. Strengthening: Hands on head    1. Move your head backward, forward, and side to side against gentle pressure from your hands, holding each position for about 6 seconds. 2. Repeat 8 to 12 times. Follow-up care is a key part of your treatment and safety. Be sure to make and go to all appointments, and call your doctor if you are having problems. It's also a good idea to know your test results and keep a list of the medicines you take. Where can you learn more? Go to http://www.ashraf.com/  Enter P975 in the search box to learn more about \"Neck: Exercises. \"  Current as of: July 1, 2021               Content Version: 13.2  © 2006-2022 [employer]. Care instructions adapted under license by [employer] (which disclaims liability or warranty for this information). If you have questions about a medical condition or this instruction, always ask your healthcare professional. [place] [address] any warranty or liability for your use of this information. Spondylolysis and Spondylolisthesis: Exercises  Introduction  Here are some examples of exercises for you to try. The exercises may be suggested for a condition or for rehabilitation. Start each exercise slowly. Ease off the exercises if you start to have pain. You will be told when to start these exercises and which ones will work best for you. How to do the exercises  Single knee-to-chest    1. Lie on your back with your knees bent and your feet flat on the floor. You can put a small pillow under your head and neck if it is more comfortable. 2. Bring one knee to your chest, keeping the other foot flat on the floor.   3. Keep your lower back pressed to the floor. Hold for 15 to 30 seconds. 4. Relax, and lower the knee to the starting position. 5. Repeat with the other leg. Repeat 2 to 4 times with each leg. 6. To get more stretch, put your other leg flat on the floor while pulling your knee to your chest.  Double knee-to-chest    1. Lie on your back with your knees bent and your feet flat on the floor. You can put a small pillow under your head and neck if it is more comfortable. 2. Bring both knees to your chest.  3. Keep your lower back pressed to the floor. Hold for 15 to 30 seconds. 4. Relax, and lower your knees to the starting position. 5. Repeat 2 to 4 times. Alternate arm and leg (bird dog) exercise    Do this exercise slowly. Try to keep your body straight at all times. 1. Start on the floor, on your hands and knees. 2. Tighten your belly muscles by pulling your belly button in toward your spine. Be sure you continue to breathe normally and do not hold your breath. 3. Raise one arm off the floor, and hold it straight out in front of you. Be careful not to let your shoulder drop down, because that will twist your trunk. 4. Hold for about 6 seconds, then lower your arm and switch to your other arm. 5. Repeat 8 to 12 times on each arm. 6. When you can do this exercise with ease and no pain, repeat steps 1 through 5. But this time do it with one leg raised off the floor, holding your leg straight out behind you. Be careful not to let your hip drop down, because that will twist your trunk. 7. When holding your leg straight out becomes easier, try raising your opposite arm at the same time, and repeat steps 1 through 5. Bridging    1. Lie on your back with both knees bent. Your knees should be bent about 90 degrees. 2. Then push your feet into the floor, squeeze your buttocks, and lift your hips off the floor until your shoulders, hips, and knees are all in a straight line.   3. Hold for about 6 seconds as you continue to breathe normally, and then slowly lower your hips back down to the floor and rest for up to 10 seconds. 4. Repeat 8 to 12 times. Curl-ups    1. Lie on the floor on your back with your knees bent at a 90-degree angle. Your feet should be flat on the floor, about 12 inches from your buttocks. 2. Cross your arms over your chest. If this bothers your neck, try putting your hands behind your neck (not your head), with your elbows spread apart. 3. Slowly tighten your belly muscles and raise your shoulder blades off the floor. 4. Keep your head in line with your body, and do not press your chin to your chest.  5. Hold this position for 1 or 2 seconds, then slowly lower yourself back down to the floor. 6. Repeat 8 to 12 times. Plank    Do this exercise slowly. Try to keep your body straight at all times, and do not let one hip drop lower than the other. 1. Lie on your stomach, resting your upper body on your forearms. 2. Tighten your belly muscles by pulling your belly button in toward your spine. 3. Keeping your knees on the floor, press down with your forearms to lift your upper body off the floor. 4. Hold for about 6 seconds, then lower your body to the floor. Rest for up to 10 seconds. 5. Repeat 8 to 12 times. 6. Over time, work up to holding for 15 to 30 seconds each time. 7. If this exercise is easy to do with your knees on the floor, try doing this exercise with your knees and legs straight, supported by your toes on the floor. Follow-up care is a key part of your treatment and safety. Be sure to make and go to all appointments, and call your doctor if you are having problems. It's also a good idea to know your test results and keep a list of the medicines you take. Where can you learn more? Go to http://www.ZAOZAO.com/  Enter M245 in the search box to learn more about \"Spondylolysis and Spondylolisthesis: Exercises. \"  Current as of: July 1, 2021               Content Version: 13.2  © 9086-2968 [employer]. Care instructions adapted under license by [employer] (which disclaims liability or warranty for this information). If you have questions about a medical condition or this instruction, always ask your healthcare professional. [address] any warranty or liability for your use of this information.

## 2022-03-24 PROBLEM — G89.29 CHRONIC LEFT-SIDED LOW BACK PAIN WITHOUT SCIATICA: Status: ACTIVE | Noted: 2022-03-17

## 2022-03-24 PROBLEM — M50.90 CERVICAL DISC DISEASE: Status: ACTIVE | Noted: 2022-03-17

## 2022-03-24 PROBLEM — M54.50 CHRONIC LEFT-SIDED LOW BACK PAIN WITHOUT SCIATICA: Status: ACTIVE | Noted: 2022-03-17

## 2022-03-24 PROBLEM — M48.061 DEGENERATIVE LUMBAR SPINAL STENOSIS: Status: ACTIVE | Noted: 2022-03-17

## 2022-03-29 ENCOUNTER — HOSPITAL ENCOUNTER (OUTPATIENT)
Dept: MRI IMAGING | Age: 52
Discharge: HOME | End: 2022-03-29
Attending: ORTHOPAEDIC SURGERY
Payer: COMMERCIAL

## 2022-03-29 DIAGNOSIS — M48.061 DEGENERATIVE LUMBAR SPINAL STENOSIS: ICD-10-CM

## 2022-03-29 PROCEDURE — 72148 MRI LUMBAR SPINE W/O DYE: CPT

## 2022-04-21 ENCOUNTER — OFFICE VISIT (OUTPATIENT)
Dept: ORTHOPEDIC SURGERY | Age: 52
End: 2022-04-21
Payer: COMMERCIAL

## 2022-04-21 VITALS — HEIGHT: 64 IN | WEIGHT: 230 LBS | BODY MASS INDEX: 39.27 KG/M2

## 2022-04-21 DIAGNOSIS — M48.061 DEGENERATIVE LUMBAR SPINAL STENOSIS: Primary | ICD-10-CM

## 2022-04-21 DIAGNOSIS — M54.42 LOW BACK PAIN WITH LEFT-SIDED SCIATICA, UNSPECIFIED BACK PAIN LATERALITY, UNSPECIFIED CHRONICITY: ICD-10-CM

## 2022-04-21 PROCEDURE — 99214 OFFICE O/P EST MOD 30 MIN: CPT | Performed by: ORTHOPAEDIC SURGERY

## 2022-04-21 RX ORDER — MELOXICAM 15 MG/1
15 TABLET ORAL DAILY
Qty: 30 TABLET | Refills: 1 | Status: SHIPPED | OUTPATIENT
Start: 2022-04-21

## 2022-04-21 NOTE — PATIENT INSTRUCTIONS
Spondylolysis and Spondylolisthesis: Exercises  Introduction  Here are some examples of exercises for you to try. The exercises may be suggested for a condition or for rehabilitation. Start each exercise slowly. Ease off the exercises if you start to have pain. You will be told when to start these exercises and which ones will work best for you. How to do the exercises  Single knee-to-chest    1. Lie on your back with your knees bent and your feet flat on the floor. You can put a small pillow under your head and neck if it is more comfortable. 2. Bring one knee to your chest, keeping the other foot flat on the floor. 3. Keep your lower back pressed to the floor. Hold for 15 to 30 seconds. 4. Relax, and lower the knee to the starting position. 5. Repeat with the other leg. Repeat 2 to 4 times with each leg. 6. To get more stretch, put your other leg flat on the floor while pulling your knee to your chest.  Double knee-to-chest    1. Lie on your back with your knees bent and your feet flat on the floor. You can put a small pillow under your head and neck if it is more comfortable. 2. Bring both knees to your chest.  3. Keep your lower back pressed to the floor. Hold for 15 to 30 seconds. 4. Relax, and lower your knees to the starting position. 5. Repeat 2 to 4 times. Alternate arm and leg (bird dog) exercise    Do this exercise slowly. Try to keep your body straight at all times. 1. Start on the floor, on your hands and knees. 2. Tighten your belly muscles by pulling your belly button in toward your spine. Be sure you continue to breathe normally and do not hold your breath. 3. Raise one arm off the floor, and hold it straight out in front of you. Be careful not to let your shoulder drop down, because that will twist your trunk. 4. Hold for about 6 seconds, then lower your arm and switch to your other arm. 5. Repeat 8 to 12 times on each arm.   6. When you can do this exercise with ease and no pain, repeat steps 1 through 5. But this time do it with one leg raised off the floor, holding your leg straight out behind you. Be careful not to let your hip drop down, because that will twist your trunk. 7. When holding your leg straight out becomes easier, try raising your opposite arm at the same time, and repeat steps 1 through 5. Bridging    1. Lie on your back with both knees bent. Your knees should be bent about 90 degrees. 2. Then push your feet into the floor, squeeze your buttocks, and lift your hips off the floor until your shoulders, hips, and knees are all in a straight line. 3. Hold for about 6 seconds as you continue to breathe normally, and then slowly lower your hips back down to the floor and rest for up to 10 seconds. 4. Repeat 8 to 12 times. Curl-ups    1. Lie on the floor on your back with your knees bent at a 90-degree angle. Your feet should be flat on the floor, about 12 inches from your buttocks. 2. Cross your arms over your chest. If this bothers your neck, try putting your hands behind your neck (not your head), with your elbows spread apart. 3. Slowly tighten your belly muscles and raise your shoulder blades off the floor. 4. Keep your head in line with your body, and do not press your chin to your chest.  5. Hold this position for 1 or 2 seconds, then slowly lower yourself back down to the floor. 6. Repeat 8 to 12 times. Plank    Do this exercise slowly. Try to keep your body straight at all times, and do not let one hip drop lower than the other. 1. Lie on your stomach, resting your upper body on your forearms. 2. Tighten your belly muscles by pulling your belly button in toward your spine. 3. Keeping your knees on the floor, press down with your forearms to lift your upper body off the floor. 4. Hold for about 6 seconds, then lower your body to the floor. Rest for up to 10 seconds. 5. Repeat 8 to 12 times.   6. Over time, work up to holding for 15 to 30 seconds each time.  7. If this exercise is easy to do with your knees on the floor, try doing this exercise with your knees and legs straight, supported by your toes on the floor. Follow-up care is a key part of your treatment and safety. Be sure to make and go to all appointments, and call your doctor if you are having problems. It's also a good idea to know your test results and keep a list of the medicines you take. Where can you learn more? Go to http://www.ashraf.com/  Enter M245 in the search box to learn more about \"Spondylolysis and Spondylolisthesis: Exercises. \"  Current as of: July 1, 2021               Content Version: 13.2  © 2006-2022 [employer]. Care instructions adapted under license by [employer] (which disclaims liability or warranty for this information). If you have questions about a medical condition or this instruction, always ask your healthcare professional. [address] any warranty or liability for your use of this information.

## 2022-04-21 NOTE — PROGRESS NOTES
John Paul Camacho (: 1970) is a 49 y.o. female, patient, here for evaluation of the following chief complaint(s):  Back Pain (Lumbar MRI Results 3/29/22)       ASSESSMENT/PLAN:  Below is the assessment and plan developed based on review of pertinent history, physical exam, labs, studies, and medications. Patient presents today for evaluation of chronic neck pain as well as acute low back pain with left lower extremity radiculopathy. Radiologic findings reviewed in detail with the patient. Cervical MRI reviewed with the patient, no significant compressive pathology. She presents for follow-up of her lumbar MRI. She still is having intermittent back pain but she is having more neck pain recently. I have sent her for a treatment evaluation consultation with pain management for both the neck and lower back. She is unsure if she is ready to start injection therapy and would like to try some other modalities. She is not needing anything surgical from us at this time. She will see us as needed. We will continue with over-the-counter medications. Have also renewed her Mobic at this time. 1. Degenerative lumbar spinal stenosis  -     REFERRAL TO PAIN MANAGEMENT; Future  2. Low back pain with left-sided sciatica, unspecified back pain laterality, unspecified chronicity  -     REFERRAL TO PAIN MANAGEMENT; Future      No follow-ups on file. SUBJECTIVE/OBJECTIVE:  John Paul Camacho (: 1970) is a 49 y.o. female. No flowsheet data found. Patient presents today for evaluation of chronic neck and acute low back pain with left lower extremity radiculopathy. She has a history of intermittent chronic neck pain. Her most significant complaint is decreased range of motion, which she feels is due to bilateral muscle tightness in her neck. Denies any upper extremity radicular symptoms. She states that the symptoms have significantly worsened over the past few weeks.   She had physical therapy a few years ago which she said helped for a while, and she is trying to keep up with her home exercise. She is currently taking meloxicam with some relief. She was put on a steroid pack during her last flareup of her symptoms which she stated helped. She denies any upper extremity weakness. She does not know any significant changes in her balance. She brings an MRI in for review today. Additionally, she reports an increase in low back pain, with pain radiating down the posterior aspect of the left lower extremity into the posterior calf over the last few weeks. She is not taking any medications at this time for this issue. Denies changes in bowel or bladder control. Denies lower extremity weakness. She has not been to physical therapy for her low back. Comes in today for follow-up of her lumbar MRI. She has chronic neck and lower back pain. Her neck is actually bothering her more lately. When she does have lower back pain it is at the lumbosacral junction with radiation across posterior buttock regions. She has intermittent radiation to the left thigh but nothing distal past the knee. She denies any bowel bladder difficulties. Imaging:    XR Results (most recent):  Results from Appointment encounter on 02/03/22    XR SPINE LUMB MIN 4 V    Narrative  AP, lateral, flexion, and extension films of the lumbar spine reveal no evidence of acute fracture or lytic lesion. There is well-maintained lumbar lordosis. There is presence of moderate multilevel disc degeneration and spondylosis. There is a minimal retrolisthesis at L5-S1 without significant movement with flexion or extension views. MRI Results (most recent):  Results from [place] encounter on 03/29/22    MRI LUMB SPINE WO CONT    Narrative  EXAM: MRI LUMB SPINE WO CONT    INDICATION: . Spinal stenosis, lumbar region without neurogenic claudication /  OPEN MRI @[facility]    COMPARISON: Radiographs 2/3/2022.     TECHNIQUE: MR imaging of the lumbar spine was performed using the following  sequences: sagittal T1, T2, STIR;  axial T1, T2.    CONTRAST:  None. FINDINGS:    Conus position, morphology and signal and normal. The conus tip is located at  the inferior L1 level. No intraspinal or paraspinal soft tissue mass is  demonstrated. Vertebral body height and bone signal and normal. There is  straightening of cervical lordosis with subtle reversal centered at L2. Retrolisthesis at L5-S1 is shown measuring 4 mm. There is also retrolisthesis at  L3-4 measuring 1 to 2 mm. T10-11 and T11-12: Shown on sagittal images only. Normal disc heights. Minimal  central disc bulging. No facet arthropathy or canal-foraminal stenosis. T12-L1: Nearly normal disc height. Mild diffuse disc bulging with superimposed  left lateral disc herniation. No facet arthropathy. No canal or foraminal  stenosis though there is probably a mild-moderate degree of left subarticular  stenosis. L1-L2: Mild disc space narrowing. Mild right paracentral through foraminal disc  bulging. No facet arthropathy. No canal stenosis. Mild right foraminal  narrowing. L2-L3: Nearly normal disc height. No disc herniation nor substantial disc  bulging. Mild bilateral facet osteoarthrosis. No canal or foraminal stenosis. L3-L4: Moderate disc space narrowing. Mild diffuse disc bulging which appears  accentuated in the left foraminal and far lateral regions. Mild bilateral facet  osteoarthrosis. No canal stenosis. Mild left foraminal narrowing. L4-L5: Normal disc height. Mild-moderate broad-based central and bilateral  paracentral disc protrusion. Mild bilateral facet osteoarthrosis. Mild  appearance of canal stenosis. No foraminal stenosis. L5-S1: Mild disc space narrowing. Mild-moderate diffuse disc bulging,  accentuated in the right paracentral regions. No facet arthropathy. Mild  appearance of canal stenosis.  Abutment of disc material with traversing right S1  nerve root which is compressed and which shows mild posterior displacement. No  substantial foraminal stenosis. Impression  1. T12-L1 left lateral disc herniation with mild-moderate left subarticular  stenosis. 2. L1-2 mild right paracentral through foraminal disc bulging with mild right  foraminal narrowing. 3. L3-4 accentuated in left foraminal and far lateral disc bulging with mild  left foraminal narrowing. 4. L4-5 broad-based central and bilateral paracentral disc protrusion with mild  canal stenosis. 5. L5-S1 disc bulging, accentuated in the right paracentral region with right S1  nerve root posterior displacement. Allergies   Allergen Reactions    Codeine Nausea and Vomiting     severe N&V       Current Outpatient Medications   Medication Sig    meloxicam (Mobic) 15 mg tablet Take 1 Tablet by mouth daily.  montelukast (SINGULAIR) 10 mg tablet Take 10 mg by mouth daily.  methylPREDNISolone (MEDROL DOSEPACK) 4 mg tablet Per dose pack instructions    gabapentin (NEURONTIN) 300 mg capsule 1-2 tabs at bedtime    meloxicam (Mobic) 15 mg tablet Take 1 Tablet by mouth daily.  cyclobenzaprine (FLEXERIL) 5 mg tablet Take 1 Tablet by mouth three (3) times daily as needed for Muscle Spasm(s).  lisinopril (PRINIVIL, ZESTRIL) 20 mg tablet Take 1 Tab by mouth daily.  atorvastatin (LIPITOR) 10 mg tablet take 1 tablet by mouth once daily    levothyroxine (SYNTHROID) 150 mcg tablet Take 1 Tab by mouth Daily (before breakfast).  multivitamin (ONE A DAY) tablet Take 1 Tab by mouth daily. No current facility-administered medications for this visit.        Past Medical History:   Diagnosis Date    Hypercholesterolemia     Hypertension     JULIAN on CPAP     Rosacea         Past Surgical History:   Procedure Laterality Date    HX  SECTION      HX CHOLECYSTECTOMY  2006    HX HYSTERECTOMY  2012       Family History   Problem Relation Age of Onset    Cancer Maternal Grandmother         colon    Cancer Maternal Grandfather         lung    Heart Disease Paternal Grandfather     Diabetes Mother     Hypertension Mother     Stroke Mother     Hypertension Father     High Cholesterol Father         Social History     Tobacco Use    Smoking status: Never Smoker    Smokeless tobacco: Never Used   Substance Use Topics    Alcohol use: Yes     Alcohol/week: 0.0 standard drinks     Comment: occasionally    Drug use: Not on file        Review of Systems   Constitutional: Negative for chills, fatigue and fever. Respiratory: Negative for cough, shortness of breath and wheezing. Cardiovascular: Negative for chest pain and palpitations. Gastrointestinal: Negative for nausea and vomiting. Musculoskeletal: Positive for arthralgias, back pain, neck pain and neck stiffness. Negative for gait problem. Neurological: Negative for dizziness, weakness, numbness and headaches. Psychiatric/Behavioral: Negative for confusion and hallucinations. All other systems reviewed and are negative. Vitals:  Ht 5' 4\" (1.626 m)   Wt 230 lb (104.3 kg)   BMI 39.48 kg/m²    Body mass index is 39.48 kg/m². Physical Exam  Neurologic  Sensory  Light Touch - Intact - Globally. Overall Assessment of Muscle Strength and Tone reveals  Lower Extremities - Right Iliopsoas - 5/5. Left Iliopsoas - 5/5. Right Tibialis Anterior - 5/5. Left Tibialis Anterior - 5/5. Right Gastroc-Soleus - 5/5. Left Gastroc-Soleus - 5/5. Right EHL - 5/5. Left EHL - 5/5. General Assessment of Reflexes  Right Ankle - Clonus is not present. Left Ankle - Clonus is not present. Reflexes (Dermatomes)  2/2 Normal - Left Achilles (L5-S2), Left Knee (L2-4), Right Achilles (L5-S2) and Right Knee (L2-4). Overall Assessment of Muscle Strength and Tone reveals  Upper Extremities - Right Deltoid - 5/5. Left Deltoid - 5/5. Right Bicep - 5/5. Left Bicep - 5/5. Right Tricep - 5/5. Left Tricep - 5/5.  Right Wrist Extensors - 5/5. Left Wrist Extensors - 5/5. Right Wrist Flexors - 5/5. Left Wrist Flexors - 5/5. Right Intrinsics - 5/5. Left Intrinsics - 5/5. General Assessment of Reflexes  Right Hand - Hernandez's sign is negative in the right hand. Left Hand - Hernandez's sign is negative in the left hand. Reflexes (Dermatomes)  2/2 Normal - Left Bicep (C5-6), Left Tricep (C7-8), Left Brachioradialis (C5-6), Right Bicep (C5-6), Right Tricep (C7-8) and Right Brachioradialis (C5-6). Musculoskeletal  Global Assessment  Examination of related systems reveals - well-developed, well-nourished, in no acute distress, alert and oriented x 3. Gait and Station - normal gait and station and normal posture. Right Lower Extremity - normal strength and tone, normal range of motion without pain and no instability, subluxation or laxity. Left Lower Extremity - normal strength and tone, normal range of motion without pain and no instability, subluxation or laxity. Spine/Ribs/Pelvis  Cervical Spine -Cervical Spine - Evaluation of related systems reveals - no lymphadenopathy and neurovascularly intact bilaterally. Inspection and Palpation - Tenderness - moderate and localized bilateral paraspinal and upper trapezius muscles. Assessment of pain reveals the following findings: - Location - cervical area. ROJM - Flexion - 85 °. Right Lateral Flexion - 35 °. Left Lateral Flexion - 35 °. Extension - 70 °. Right Rotation - 70 °. Left Rotation - 70 °. . Thoracic (Dorsal) Spine - Examination of the thoracic spine reveals - no tenderness over thoracic vertebrae, no pain, normal sensation and normal thoracic spine movements. Lumbosacral Spine - Examination of the lumbosacral spine reveals - no known fractures or deformities. Inspection and Palpation - Tenderness - moderate. Assessment of pain reveals the following findings - The pain is characterized as - moderate. Location - pain refers to lower back bilaterally. ROJM - Trunk Extension - 15 degrees.  Lumbar Spine Flexion - 35 °. Lumbosacral Spine - Functional Testing - Babinski Test negative, Prone Knee Bending Test negative, Slump Test negative, Straight Leg Raising Test negative. An electronic signature was used to authenticate this note.   -- Cb Beckwith MD

## 2022-04-21 NOTE — PROGRESS NOTES
1. Have you been to the ER, urgent care clinic since your last visit? Hospitalized since your last visit? No    2. Have you seen or consulted any other health care providers outside of the [address] [place] [address] since your last visit? Include any pap smears or colon screening.  No    Chief Complaint   Patient presents with    Back Pain     Lumbar MRI Results 3/29/22

## 2022-05-25 ENCOUNTER — TRANSCRIBE ORDER (OUTPATIENT)
Dept: SCHEDULING | Age: 52
End: 2022-05-25

## 2022-05-25 DIAGNOSIS — R10.32 ABDOMINAL PAIN, LEFT LOWER QUADRANT: Primary | ICD-10-CM

## 2022-05-26 ENCOUNTER — HOSPITAL ENCOUNTER (OUTPATIENT)
Dept: CT IMAGING | Age: 52
Discharge: HOME | End: 2022-05-26
Attending: FAMILY MEDICINE
Payer: COMMERCIAL

## 2022-05-26 DIAGNOSIS — R10.32 ABDOMINAL PAIN, LEFT LOWER QUADRANT: ICD-10-CM

## 2022-05-26 PROCEDURE — 74011000250 HC RX REV CODE- 250

## 2022-05-26 PROCEDURE — 74177 CT ABD & PELVIS W/CONTRAST: CPT

## 2022-05-26 PROCEDURE — 74011000636 HC RX REV CODE- 636

## 2022-05-26 RX ORDER — BARIUM SULFATE 20 MG/ML
900 SUSPENSION ORAL ONCE
Status: COMPLETED | OUTPATIENT
Start: 2022-05-26 | End: 2022-05-26

## 2022-05-26 RX ADMIN — BARIUM SULFATE 900 ML: 20 SUSPENSION ORAL at 11:14

## 2022-05-26 RX ADMIN — IOPAMIDOL 100 ML: 755 INJECTION, SOLUTION INTRAVENOUS at 11:14

## 2022-06-30 DIAGNOSIS — M48.061 DEGENERATIVE LUMBAR SPINAL STENOSIS: ICD-10-CM

## 2022-06-30 DIAGNOSIS — M54.42 LOW BACK PAIN WITH LEFT-SIDED SCIATICA, UNSPECIFIED BACK PAIN LATERALITY, UNSPECIFIED CHRONICITY: ICD-10-CM

## 2023-03-13 ENCOUNTER — TRANSCRIBE ORDER (OUTPATIENT)
Dept: GENERAL RADIOLOGY | Age: 53
End: 2023-03-13

## 2023-03-13 ENCOUNTER — HOSPITAL ENCOUNTER (OUTPATIENT)
Dept: GENERAL RADIOLOGY | Age: 53
Discharge: HOME | End: 2023-03-13
Attending: FAMILY MEDICINE
Payer: COMMERCIAL

## 2023-03-13 DIAGNOSIS — M25.512 LEFT SHOULDER PAIN: Primary | ICD-10-CM

## 2023-03-13 DIAGNOSIS — M25.512 LEFT SHOULDER PAIN: ICD-10-CM

## 2023-03-13 PROCEDURE — 73030 X-RAY EXAM OF SHOULDER: CPT

## 2023-03-31 ENCOUNTER — TELEPHONE (OUTPATIENT)
Dept: ORTHOPEDIC SURGERY | Age: 53
End: 2023-03-31

## 2023-03-31 NOTE — TELEPHONE ENCOUNTER
We received a referral from [facility] to schedule patient for lt shoulder pain w/ dr Ramez Aden.  Left pt voicemail to call our office and schedule an appt

## 2023-04-19 ENCOUNTER — OFFICE VISIT (OUTPATIENT)
Dept: ORTHOPEDIC SURGERY | Age: 53
End: 2023-04-19
Payer: COMMERCIAL

## 2023-04-19 DIAGNOSIS — M54.12 CERVICAL RADICULITIS: Primary | ICD-10-CM

## 2023-04-19 PROCEDURE — 99214 OFFICE O/P EST MOD 30 MIN: CPT | Performed by: ORTHOPAEDIC SURGERY

## 2023-04-19 NOTE — PROGRESS NOTES
Ravinder Webb (: 1970) is a 47 y.o. female, patient, here for evaluation of the following chief complaint(s):  Shoulder Pain (Left shoulder )       HPI:    36-year-old female presents to clinic today for evaluation of left shoulder pain. Is been present for approximately 6 weeks. She was having a fair amount of discomfort when she was sleeping at night, but it has gotten somewhat better over the past couple weeks. She denies any significant symptoms during daytime use of the shoulder. She localizes the pain to the posterior aspect of the shoulder around her shoulder blade. She does note a history of significant cervical spine discomfort and range of motion issues. She has seen Dr. Ivelisse Rdz in the past who did not recommend surgery. She is also seen pain specialists who had done a variety of injections to the left parascapular and shoulder region. She had responded well to these previously. She denies any numbness or tingling down the extremity. Allergies   Allergen Reactions    Codeine Nausea and Vomiting     severe N&V       Current Outpatient Medications   Medication Sig    meloxicam (Mobic) 15 mg tablet Take 1 Tablet by mouth daily. montelukast (SINGULAIR) 10 mg tablet Take 10 mg by mouth daily. methylPREDNISolone (MEDROL DOSEPACK) 4 mg tablet Per dose pack instructions    gabapentin (NEURONTIN) 300 mg capsule 1-2 tabs at bedtime    cyclobenzaprine (FLEXERIL) 5 mg tablet Take 1 Tablet by mouth three (3) times daily as needed for Muscle Spasm(s). lisinopril (PRINIVIL, ZESTRIL) 20 mg tablet Take 1 Tab by mouth daily. atorvastatin (LIPITOR) 10 mg tablet take 1 tablet by mouth once daily    levothyroxine (SYNTHROID) 150 mcg tablet Take 1 Tab by mouth Daily (before breakfast). multivitamin (ONE A DAY) tablet Take 1 Tab by mouth daily. No current facility-administered medications for this visit.        Past Medical History:   Diagnosis Date    Hypercholesterolemia     Hypertension JULIAN on CPAP     Rosacea         Past Surgical History:   Procedure Laterality Date    HX  SECTION  2008    HX CHOLECYSTECTOMY  2006    HX HYSTERECTOMY  2012       Family History   Problem Relation Age of Onset    Cancer Maternal Grandmother         colon    Cancer Maternal Grandfather         lung    Heart Disease Paternal Grandfather     Diabetes Mother     Hypertension Mother     Stroke Mother     Hypertension Father     High Cholesterol Father         Social History     Socioeconomic History    Marital status:      Spouse name: Not on file    Number of children: Not on file    Years of education: Not on file    Highest education level: Not on file   Occupational History    Not on file   Tobacco Use    Smoking status: Never    Smokeless tobacco: Never   Substance and Sexual Activity    Alcohol use: Yes     Alcohol/week: 0.0 standard drinks     Comment: occasionally    Drug use: Not on file    Sexual activity: Yes     Partners: Male     Birth control/protection: Surgical   Other Topics Concern    Not on file   Social History Narrative    Not on file     Social Determinants of Health     Financial Resource Strain: Not on file   Food Insecurity: Not on file   Transportation Needs: Not on file   Physical Activity: Not on file   Stress: Not on file   Social Connections: Not on file   Intimate Partner Violence: Not on file   Housing Stability: Not on file       Review of Systems   Musculoskeletal:         Left shoulder      Vitals: There were no vitals taken for this visit. There is no height or weight on file to calculate BMI. Ortho Exam     Patient is alert and orient x3. She is in no acute distress. She walks with a nonantalgic gait    Left shoulder: No shoulder girdle atrophy. There is no soft tissue swelling, ecchymosis, abrasions or lacerations. Active range of motion is full with forward flexion, lateral abduction and external rotation.   Internal rotation is to the lumbar level with a negative lift-off sign. Passive range of motion is full with a negative impingement sign and a negative Avilez sign. Rotator cuff strength with forward flexion, lateral abduction and external rotation is intact with 5/5 strength. There is no crepitation about the joint. Palpation of the [facility] joint does not reproduce discomfort, and there is no pain elicited with cross-body adduction. Strength of the extremity is 5/5 at biceps/triceps/wrist extension. DRT's are intact at +2/4 and  symmetrical.  Cervical range of motion is particularly limited to rotation, she can rotate right approximately 20 degrees and left 30 degrees. She has appropriate range of motion to flexion and extension. She does have point tenderness to the medial parascapular region. Spurling's sign is negative. Right shoulder: No shoulder girdle atrophy. There is no soft tissue swelling, ecchymosis, abrasions or lacerations. Active range of motion is full with forward flexion, lateral abduction and external rotation. Internal rotation is to the lumbar level with a negative lift-off sign. Passive range of motion is full with a negative impingement sign and a negative Avilez sign. Rotator cuff strength with forward flexion, lateral abduction and external rotation is intact with 5/5 strength. There is no crepitation about the joint. Palpation of the [facility] joint does not reproduce discomfort, and there is no pain elicited with cross-body adduction. Strength of the extremity is 5/5 at biceps/triceps/wrist extension. DRT's are intact at +2/4 and  symmetrical.  Cervical range of motion is full with no pain to palpation along the paraspinal musculature medial border of the scapula. Spurling's sign is negative. Three-view x-ray of the left shoulder reveals no evidence of fracture dislocation no signs of glenohumeral arthritis. ASSESSMENT/PLAN:    Discussed our findings with the patient.   She does not have any obvious findings of intrinsic shoulder pathology on her physical or radiographic examination. Her pain pattern is more consistent with a parascapular muscular strain versus cervical radiculitis. She did report good relief of these similar symptoms from her previous encounters with the pain specialist team.  We did feel that returning to their care would be appropriate. Any specific modalities for her left shoulder regarding MRI or steroid injection did not feel warranted today. We have discussed appropriate use of over-the-counter anti-inflammatory medication as well. Patient is to return to clinic on an as-needed basis moving forward.         Gary Tesfaye MD

## 2023-05-24 RX ORDER — ATORVASTATIN CALCIUM 10 MG/1
1 TABLET, FILM COATED ORAL DAILY
COMMUNITY
Start: 2017-10-17

## 2023-05-24 RX ORDER — GABAPENTIN 300 MG/1
CAPSULE ORAL
COMMUNITY
Start: 2022-02-03

## 2023-05-24 RX ORDER — METHYLPREDNISOLONE 4 MG/1
TABLET ORAL
COMMUNITY
Start: 2022-02-03

## 2023-05-24 RX ORDER — LEVOTHYROXINE SODIUM 0.15 MG/1
150 TABLET ORAL
COMMUNITY
Start: 2017-07-07

## 2023-05-24 RX ORDER — MELOXICAM 15 MG/1
15 TABLET ORAL DAILY
COMMUNITY
Start: 2022-04-21

## 2023-05-24 RX ORDER — CYCLOBENZAPRINE HCL 5 MG
5 TABLET ORAL 3 TIMES DAILY PRN
COMMUNITY
Start: 2022-02-03

## 2023-05-24 RX ORDER — MONTELUKAST SODIUM 10 MG/1
10 TABLET ORAL DAILY
COMMUNITY

## 2023-05-24 RX ORDER — LISINOPRIL 20 MG/1
20 TABLET ORAL DAILY
COMMUNITY
Start: 2018-04-10

## 2023-11-30 ENCOUNTER — TRANSCRIBE ORDERS (OUTPATIENT)
Facility: HOSPITAL | Age: 53
End: 2023-11-30

## 2023-11-30 ENCOUNTER — HOSPITAL ENCOUNTER (OUTPATIENT)
Facility: HOSPITAL | Age: 53
Discharge: HOME | End: 2023-11-30
Attending: FAMILY MEDICINE
Payer: COMMERCIAL

## 2023-11-30 DIAGNOSIS — R06.09 DYSPNEA ON EXERTION: Primary | ICD-10-CM

## 2023-11-30 DIAGNOSIS — R06.09 DYSPNEA ON EXERTION: ICD-10-CM

## 2023-11-30 PROCEDURE — 6360000004 HC RX CONTRAST MEDICATION: Performed by: FAMILY MEDICINE

## 2023-11-30 PROCEDURE — 71275 CT ANGIOGRAPHY CHEST: CPT

## 2023-11-30 RX ADMIN — IOPAMIDOL 100 ML: 755 INJECTION, SOLUTION INTRAVENOUS at 17:44

## 2025-01-10 ENCOUNTER — HOSPITAL ENCOUNTER (OUTPATIENT)
Facility: HOSPITAL | Age: 55
Discharge: HOME | End: 2025-01-13
Attending: FAMILY MEDICINE
Payer: COMMERCIAL

## 2025-01-10 DIAGNOSIS — R10.84 ABDOMINAL PAIN, GENERALIZED: ICD-10-CM

## 2025-01-10 PROCEDURE — 6360000004 HC RX CONTRAST MEDICATION: Performed by: FAMILY MEDICINE

## 2025-01-10 PROCEDURE — 74177 CT ABD & PELVIS W/CONTRAST: CPT

## 2025-01-10 RX ORDER — IOPAMIDOL 755 MG/ML
100 INJECTION, SOLUTION INTRAVASCULAR
Status: COMPLETED | OUTPATIENT
Start: 2025-01-10 | End: 2025-01-10

## 2025-01-10 RX ADMIN — IOPAMIDOL 100 ML: 755 INJECTION, SOLUTION INTRAVENOUS at 16:29

## 2025-01-20 ENCOUNTER — TELEPHONE (OUTPATIENT)
Age: 55
End: 2025-01-20

## 2025-01-20 NOTE — TELEPHONE ENCOUNTER
LVM for Pt. To call central scheduling for an order that was faxed to [facility].

## 2025-02-05 ENCOUNTER — HOSPITAL ENCOUNTER (OUTPATIENT)
Facility: HOSPITAL | Age: 55
Discharge: HOME | End: 2025-02-08

## 2025-02-05 DIAGNOSIS — E78.5 HYPERLIPIDEMIA, UNSPECIFIED HYPERLIPIDEMIA TYPE: ICD-10-CM

## 2025-02-05 PROCEDURE — 75571 CT HRT W/O DYE W/CA TEST: CPT

## 2025-03-10 ENCOUNTER — HOSPITAL ENCOUNTER (OUTPATIENT)
Facility: HOSPITAL | Age: 55
Discharge: HOME | End: 2025-03-13
Attending: FAMILY MEDICINE
Payer: COMMERCIAL

## 2025-03-10 ENCOUNTER — TRANSCRIBE ORDERS (OUTPATIENT)
Facility: HOSPITAL | Age: 55
End: 2025-03-10

## 2025-03-10 DIAGNOSIS — M25.562 PAIN, JOINT, KNEE, LEFT: Primary | ICD-10-CM

## 2025-03-10 DIAGNOSIS — M25.562 PAIN, JOINT, KNEE, LEFT: ICD-10-CM

## 2025-03-10 PROCEDURE — 73562 X-RAY EXAM OF KNEE 3: CPT

## 2025-06-11 ENCOUNTER — HOSPITAL ENCOUNTER (OUTPATIENT)
Facility: HOSPITAL | Age: 55
Setting detail: SPECIMEN
Discharge: HOME | End: 2025-06-14

## 2025-06-24 ENCOUNTER — HOSPITAL ENCOUNTER (OUTPATIENT)
Facility: HOSPITAL | Age: 55
Setting detail: RECURRING SERIES
Discharge: HOME | End: 2025-06-27
Payer: COMMERCIAL

## 2025-06-24 PROCEDURE — 97110 THERAPEUTIC EXERCISES: CPT

## 2025-06-24 PROCEDURE — 97162 PT EVAL MOD COMPLEX 30 MIN: CPT

## 2025-06-24 PROCEDURE — 97016 VASOPNEUMATIC DEVICE THERAPY: CPT

## 2025-06-24 NOTE — THERAPY EVALUATION
Goals: \"flex knee to take long walks, get back on bike\"  Motivation: good  Cognition: A & O x 4            OBJECTIVE  Gait and Functional Mobility:  Antalgic, decreased L knee flexion during swing, decreased L heel strike, decreased L stance time    Lumbar ROM:   Flexion: NT   Extension: NT   Side Bending: Right: NT   Left: NT   Rotation: Right: NT    Left: NT    Balance Assessment: Moderate deficits in balance and neuromuscular control in single limb stance on L    Squat Assessment:   Double Leg deviate to the R, forward trunk lean, quadriceps dominant   Single Leg NT    Neurological: Sensations: NT    Flexibility: Moderate restrictions in hamstrings, hip internal and external rotators, quadriceps, iliopsoas, and gastrocnemius/soleus complex bilaterally     Right Knee:  AROM 0-135 PROM NT  Left  Knee:  AROM +12/+7- 70     LOWER QUARTER   MUSCLE STRENGTH  KEY       R  L  0 - No Contraction  Hip flex  5  3  1 - Trace          er    4  3  2 - Poor          ir   4+  3  3 - Fair           abd  NT  NT  4 - Good          ext  NT  NT  5 - Normal   Knee flex  4+  3               Ext  5  3      Ankle DF  5  3                PF  5  3     *Did not MMT LLE, just tested against gravity    Knee Circumference Measures:  Right: Middle patella- 48.4 cm  Left: Middle patella- 49.6 cm    Joint Mobility Assessment: Decreased L patellar mobility in all planes  Palpation: TTP along L knee diffusely    Special Tests:Varus: NT     SLR: NT    Valgus: NT     Slump: NT    Allan's: NT    Giancarlo: NT    Anterior Drawer: NT    Obers: NT    Posterior Drawer: NT    Clonus: NT    Lachman's: NT    SLS: R- 20s, L- 11s      Objective/Functional Outcome Measure: Knee  FOTO Score: 41/100  FOTO score = an established functional score where 100 = no disability      40 min [x]Eval - untimed                        Therapeutic Procedures:  Tx Min Billable or 1:1 Min (if diff from Tx Min) Procedure, Rationale, Specifics   [id number] Therapeutic Exercise

## 2025-06-27 ENCOUNTER — HOSPITAL ENCOUNTER (OUTPATIENT)
Facility: HOSPITAL | Age: 55
Setting detail: RECURRING SERIES
Discharge: HOME | End: 2025-06-30
Payer: COMMERCIAL

## 2025-06-27 PROCEDURE — 97016 VASOPNEUMATIC DEVICE THERAPY: CPT

## 2025-06-27 PROCEDURE — 97140 MANUAL THERAPY 1/> REGIONS: CPT

## 2025-06-27 PROCEDURE — 97110 THERAPEUTIC EXERCISES: CPT

## 2025-06-27 NOTE — PROGRESS NOTES
PHYSICAL THERAPY - MEDICARE DAILY TREATMENT NOTE (updated 3/23)      Date: 2025          Patient Name:  Becky Colvin :  1970   Medical   Diagnosis:  Pain in left knee [M25.562] Treatment Diagnosis:  M25.562 LEFT KNEE PAIN    Referral Source:  Lance Wang MD Insurance:   Payor: [facility] / Plan: [facility] / Product Type: *No Product type* /                     Patient  verified yes     Visit #   Current  / Total 2 24   Time   In / Out 943 1051   Total Treatment Time 68   Total Timed Codes 58   1:1 Treatment Time 51      [facility] Totals Reminder:  bill using total billable   min of TIMED therapeutic procedures and modalities.   8-22 min = 1 unit; 23-37 min = 2 units; 38-52 min = 3 units; 53-67 min = 4 units; 68-82 min = 5 units            SUBJECTIVE  If an interpreting service was utilized for treatment of this patient, the contents of this document represent the material reviewed with the patient via the .     Pain Level (0-10 scale): 0/10    Any medication changes, allergies to medications, adverse drug reactions, diagnosis change, or new procedure performed?: [x] No    [] Yes (see summary sheet for update)  Medications: Verified on Patient Summary List    Subjective functional status/changes:     Patient noted increased amounts of stiffness, but noted their pain has been doing well. Patient has continued to work on their exercises at home as well.    OBJECTIVE      Therapeutic Procedures:  Tx Min Billable or 1:1 Min (if diff from Tx Min) Procedure, Rationale, Specifics   [id number] [phone] [id number] Therapeutic Exercise (timed):  increase ROM, strength, coordination, balance, and proprioception to improve patient's ability to progress to PLOF and address remaining functional goals. (see flow sheet as applicable)     Details if applicable:     15 [id number] Manual Therapy (timed):  decrease pain, increase ROM, increase tissue extensibility, decrease trigger points, and increase postural awareness

## 2025-07-02 ENCOUNTER — APPOINTMENT (OUTPATIENT)
Facility: HOSPITAL | Age: 55
End: 2025-07-02
Attending: ORTHOPAEDIC SURGERY
Payer: COMMERCIAL

## 2025-07-03 ENCOUNTER — HOSPITAL ENCOUNTER (OUTPATIENT)
Facility: HOSPITAL | Age: 55
Setting detail: RECURRING SERIES
Discharge: HOME | End: 2025-07-06
Payer: COMMERCIAL

## 2025-07-03 PROCEDURE — 97016 VASOPNEUMATIC DEVICE THERAPY: CPT

## 2025-07-03 PROCEDURE — 97140 MANUAL THERAPY 1/> REGIONS: CPT

## 2025-07-03 PROCEDURE — 97110 THERAPEUTIC EXERCISES: CPT

## 2025-07-03 NOTE — PROGRESS NOTES
[phone]    Pain Level at end of session (0-10 scale): 0/10      Assessment   The Pt tolerated the manual therapy well and her ROM has improved greatly.  She tolerated the exercises well without increased pain or discomfort. Will continue to progress as tolerated during next PT session.  Patient will continue to benefit from skilled PT / OT services to modify and progress therapeutic interventions, analyze and address functional mobility deficits, analyze and address ROM deficits, analyze and address strength deficits, analyze and address soft tissue restrictions, analyze and cue for proper movement patterns, and analyze and modify for postural abnormalities to address functional deficits and attain remaining goals.    Progress toward goals / Updated goals:  []  See Progress Note/Recertification    Short Term Goals: To be accomplished in 6-8 treatments.  1. The Pt will be independent and compliant with their HEP- progressing  2. The Pt will report a 50% reduction in their pain with ADLs- progressing  Long Term Goals: To be accomplished in 20-24 treatments.  The Pt will score the MCII on her FOTO survey demonstrating improved overall function (41 to 53 points)- progressing  The Pt will have >/= 120 degrees of L knee flexion to allow the Pt to be able to perform bending activities with less difficulty- progressing  The Pt will be able to walk >/= 30 minutes with 0-2/10 pain to allow the Pt to return to her prior level of recreation with less difficulty- progressing  The Pt will report >/= 75% improvement in her symptoms to allow the Pt to return to her PLOF with less pain or discomfort- progressing      PLAN  Yes  Continue plan of care  Re-Cert Due: 9/22/2025  [x]  Upgrade activities as tolerated  []  Discharge due to:  []  Other:      Pamela Moralez, PT       7/3/2025       8:32 AM

## 2025-07-07 ENCOUNTER — APPOINTMENT (OUTPATIENT)
Facility: HOSPITAL | Age: 55
End: 2025-07-07
Payer: COMMERCIAL

## 2025-07-09 ENCOUNTER — HOSPITAL ENCOUNTER (OUTPATIENT)
Facility: HOSPITAL | Age: 55
Setting detail: RECURRING SERIES
Discharge: HOME | End: 2025-07-12
Payer: COMMERCIAL

## 2025-07-09 PROCEDURE — 97140 MANUAL THERAPY 1/> REGIONS: CPT

## 2025-07-09 PROCEDURE — 97016 VASOPNEUMATIC DEVICE THERAPY: CPT

## 2025-07-09 PROCEDURE — 97110 THERAPEUTIC EXERCISES: CPT

## 2025-07-09 NOTE — PROGRESS NOTES
Objective/Functional Measures    Pain Level at end of session (0-10 scale): 0/10      Assessment   Patient tolerated treatment session well today but requested a shortened visit due to their son's doctor appointment today. Patient was able to progress with their weight bearing and strengthening during therex today. Continue to progress as tolerated.  Patient will continue to benefit from skilled PT / OT services to modify and progress therapeutic interventions, analyze and address functional mobility deficits, analyze and address ROM deficits, analyze and address strength deficits, analyze and address soft tissue restrictions, analyze and cue for proper movement patterns, and analyze and modify for postural abnormalities to address functional deficits and attain remaining goals.    Progress toward goals / Updated goals:  []  See Progress Note/Recertification    Short Term Goals: To be accomplished in 6-8 treatments.  1. The Pt will be independent and compliant with their HEP- progressing  2. The Pt will report a 50% reduction in their pain with ADLs- progressing  Long Term Goals: To be accomplished in 20-24 treatments.  The Pt will score the MCII on her FOTO survey demonstrating improved overall function (41 to 53 points)- progressing  The Pt will have >/= 120 degrees of L knee flexion to allow the Pt to be able to perform bending activities with less difficulty- progressing  The Pt will be able to walk >/= 30 minutes with 0-2/10 pain to allow the Pt to return to her prior level of recreation with less difficulty- progressing  The Pt will report >/= 75% improvement in her symptoms to allow the Pt to return to her PLOF with less pain or discomfort- progressing      PLAN  Yes  Continue plan of care  Re-Cert Due: 9/22/2025  [x]  Upgrade activities as tolerated  []  Discharge due to:  []  Other:      Sadi Winter, ZANA       7/9/2025       11:21 AM

## 2025-07-14 ENCOUNTER — HOSPITAL ENCOUNTER (OUTPATIENT)
Facility: HOSPITAL | Age: 55
Setting detail: RECURRING SERIES
Discharge: HOME | End: 2025-07-17
Payer: COMMERCIAL

## 2025-07-14 PROCEDURE — 97110 THERAPEUTIC EXERCISES: CPT

## 2025-07-14 PROCEDURE — 97140 MANUAL THERAPY 1/> REGIONS: CPT

## 2025-07-14 PROCEDURE — 97016 VASOPNEUMATIC DEVICE THERAPY: CPT

## 2025-07-16 ENCOUNTER — HOSPITAL ENCOUNTER (OUTPATIENT)
Facility: HOSPITAL | Age: 55
Discharge: HOME | End: 2025-07-19
Attending: FAMILY MEDICINE
Payer: COMMERCIAL

## 2025-07-16 ENCOUNTER — TRANSCRIBE ORDERS (OUTPATIENT)
Facility: HOSPITAL | Age: 55
End: 2025-07-16

## 2025-07-16 DIAGNOSIS — R06.02 SHORTNESS OF BREATH: Primary | ICD-10-CM

## 2025-07-16 DIAGNOSIS — R06.02 SHORTNESS OF BREATH: ICD-10-CM

## 2025-07-16 PROCEDURE — 71046 X-RAY EXAM CHEST 2 VIEWS: CPT

## 2025-07-17 ENCOUNTER — HOSPITAL ENCOUNTER (OUTPATIENT)
Facility: HOSPITAL | Age: 55
Setting detail: RECURRING SERIES
Discharge: HOME | End: 2025-07-20
Payer: COMMERCIAL

## 2025-07-17 PROCEDURE — 97110 THERAPEUTIC EXERCISES: CPT

## 2025-07-17 PROCEDURE — 97140 MANUAL THERAPY 1/> REGIONS: CPT

## 2025-07-17 NOTE — PROGRESS NOTES
decrease trigger points, and increase postural awareness to improve patient's ability to progress to PLOF and address remaining functional goals.  The manual therapy interventions were performed at a separate and distinct time from the therapeutic activities interventions . (see flow sheet as applicable)     Details if applicable:    L patellar glides (superior, inferior, medial and lateral grade III/IV)  L PROM knee flexion stretching at edge of table                  53 32    Total Total         Modalities Rationale:     decrease edema, decrease inflammation, and decrease pain to improve patient's ability to progress to PLOF and address remaining functional goals.       min [] Estim Unattended,             type/location:       []  w/ice    []  w/heat        min [] Estim Attended,             type/location:       []  w/ice   []  w/heat         []  w/US   []  TENS insruct            min []  Mechanical Traction,        type/lbs:        []  pro      []  sup           []  int       []  cont            []  before manual           []  after manual     min []  Ultrasound,         settings/location:      min  unbilled []  Ice     []  Heat            location/position:    nt     min [x]  Vasopneumatic Device,      press/temp: 34/med   pre-treatment girth : 49.5 cm   post-treatment girth :  49.0 cm   measured at (landmark       location) : L mid patella  If using vaso (only need to measure limb vaso being performed on)        min []  Other:      Skin assessment post-treatment (if applicable):    [x]  intact    []  redness- no adverse reaction                 []redness - adverse reaction:          [x]  Patient Education billed concurrently with other procedures   [x] Review HEP    [] Progressed/Changed HEP, detail:    [] Other detail:         Other Objective/Functional Measures  L Knee AROM: 0-126 degrees    Pain Level at end of session (0-10 scale): 0/10      Assessment   Patient tolerated treatment session well today, able

## 2025-07-21 ENCOUNTER — APPOINTMENT (OUTPATIENT)
Facility: HOSPITAL | Age: 55
End: 2025-07-21
Payer: COMMERCIAL

## 2025-07-24 ENCOUNTER — HOSPITAL ENCOUNTER (OUTPATIENT)
Facility: HOSPITAL | Age: 55
Setting detail: RECURRING SERIES
Discharge: HOME | End: 2025-07-27
Payer: COMMERCIAL

## 2025-07-24 PROCEDURE — 97110 THERAPEUTIC EXERCISES: CPT

## 2025-07-24 PROCEDURE — 97016 VASOPNEUMATIC DEVICE THERAPY: CPT

## 2025-07-24 PROCEDURE — 97140 MANUAL THERAPY 1/> REGIONS: CPT

## 2025-07-24 NOTE — PROGRESS NOTES
PHYSICAL THERAPY - MEDICARE DAILY TREATMENT NOTE (updated 3/23)      Date: 2025          Patient Name:  Becky Colvin :  1970   Medical   Diagnosis:  Pain in left knee [M25.562] Treatment Diagnosis:  M25.562 LEFT KNEE PAIN    Referral Source:  Lance Wang MD Insurance:   [place]: [place] [employer] / Plan: [facility] / Product Type: *No Product type* /                     Patient  verified yes     Visit #   Current  / Total 7 24   Time   In / Out 9:06 AM 10:10 AM   Total Treatment Time 64 minutes   Total Timed Codes 54 minutes   1:1 Treatment Time 25 minutes      [facility] Totals Reminder:  bill using total billable   min of TIMED therapeutic procedures and modalities.   8-22 min = 1 unit; 23-37 min = 2 units; 38-52 min = 3 units; 53-67 min = 4 units; 68-82 min = 5 units            SUBJECTIVE  If an interpreting service was utilized for treatment of this patient, the contents of this document represent the material reviewed with the patient via the .     Pain Level (0-10 scale): 0/10    Any medication changes, allergies to medications, adverse drug reactions, diagnosis change, or new procedure performed?: [x] No    [] Yes (see summary sheet for update)  Medications: Verified on Patient Summary List    Subjective functional status/changes:     The Pt reports that she is doing well overall.  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  She took her single cane due to uneven pavers on the paths, but felt good otherwise. She denies any increased pain/soreness.  She has been able to navigate stairs with a reciprocal gait pattern on stairs; she feels more confident going up the stairs vs down.  She is able to go down the stairs with a reciprocal gait pattern, but has to hold on; she has had some instances of instability going down the stairs.  She is able to navigate the curbs well in the community, but still uncomfortable going down vs. Up. Her weight bearing tolerance is 
Term Goals: To be accomplished in 20-24 treatments.  The Pt will score the MCII on her FOTO survey demonstrating improved overall function (41 to 53 points)- met  The Pt will have >/= 120 degrees of L knee flexion to allow the Pt to be able to perform bending activities with less difficulty- met  The Pt will be able to walk >/= 30 minutes with 0-2/10 pain to allow the Pt to return to her prior level of recreation with less difficulty- progressing  The Pt will report >/= 75% improvement in her symptoms to allow the Pt to return to her PLOF with less pain or discomfort- met    RECOMMENDATIONS FOR SKILLED THERAPY:  Continue with PT 1x/wk per her POC        Pamela Moralez, PT       7/24/2025       12:39 PM    If you have any questions/comments please contact us directly at [phone].   Thank you for allowing us to assist in the care of your patient.

## 2025-08-04 ENCOUNTER — HOSPITAL ENCOUNTER (OUTPATIENT)
Facility: HOSPITAL | Age: 55
Setting detail: RECURRING SERIES
Discharge: HOME | End: 2025-08-07
Payer: COMMERCIAL

## 2025-08-04 PROCEDURE — 97110 THERAPEUTIC EXERCISES: CPT

## 2025-08-14 ENCOUNTER — APPOINTMENT (OUTPATIENT)
Facility: HOSPITAL | Age: 55
End: 2025-08-14
Payer: COMMERCIAL

## 2025-08-20 ENCOUNTER — APPOINTMENT (OUTPATIENT)
Facility: HOSPITAL | Age: 55
End: 2025-08-20
Payer: COMMERCIAL